# Patient Record
Sex: FEMALE | Race: ASIAN | Employment: UNEMPLOYED | URBAN - METROPOLITAN AREA
[De-identification: names, ages, dates, MRNs, and addresses within clinical notes are randomized per-mention and may not be internally consistent; named-entity substitution may affect disease eponyms.]

---

## 2017-02-17 ENCOUNTER — TELEPHONE (OUTPATIENT)
Dept: INTERNAL MEDICINE CLINIC | Facility: CLINIC | Age: 37
End: 2017-02-17

## 2017-02-18 NOTE — TELEPHONE ENCOUNTER
Patient called with concern for sinus infection. Has had sinus pressure and nasal congestion for 5 days. Is 24 weeks pregnant. +FM, no LOF, no CTX. No fever. Has taken Sudafed and tylenol which helps. Patient instructed to increase fluids.  Start nasal sali

## 2017-02-20 NOTE — TELEPHONE ENCOUNTER
Spoke with patient and scheduled appointment for tomorrow, she was wondering if dr could prescribe something today?

## 2017-02-21 ENCOUNTER — OFFICE VISIT (OUTPATIENT)
Dept: FAMILY MEDICINE CLINIC | Facility: CLINIC | Age: 37
End: 2017-02-21

## 2017-02-21 VITALS
HEIGHT: 67 IN | SYSTOLIC BLOOD PRESSURE: 94 MMHG | RESPIRATION RATE: 16 BRPM | TEMPERATURE: 98 F | WEIGHT: 147 LBS | DIASTOLIC BLOOD PRESSURE: 60 MMHG | OXYGEN SATURATION: 98 % | BODY MASS INDEX: 23.07 KG/M2 | HEART RATE: 106 BPM

## 2017-02-21 DIAGNOSIS — H66.001 ACUTE SUPPURATIVE OTITIS MEDIA OF RIGHT EAR WITHOUT SPONTANEOUS RUPTURE OF TYMPANIC MEMBRANE, RECURRENCE NOT SPECIFIED: ICD-10-CM

## 2017-02-21 DIAGNOSIS — J01.00 ACUTE NON-RECURRENT MAXILLARY SINUSITIS: Primary | ICD-10-CM

## 2017-02-21 DIAGNOSIS — H10.33 ACUTE CONJUNCTIVITIS OF BOTH EYES, UNSPECIFIED ACUTE CONJUNCTIVITIS TYPE: ICD-10-CM

## 2017-02-21 PROCEDURE — 99214 OFFICE O/P EST MOD 30 MIN: CPT | Performed by: FAMILY MEDICINE

## 2017-02-21 RX ORDER — ACETAMINOPHEN AND CODEINE PHOSPHATE 300; 30 MG/1; MG/1
1 TABLET ORAL 2 TIMES DAILY PRN
Qty: 15 TABLET | Refills: 0 | Status: SHIPPED | OUTPATIENT
Start: 2017-02-21 | End: 2017-05-01 | Stop reason: ALTCHOICE

## 2017-02-21 RX ORDER — TOBRAMYCIN 3 MG/ML
2 SOLUTION/ DROPS OPHTHALMIC EVERY 6 HOURS
Qty: 5 ML | Refills: 0 | Status: SHIPPED | OUTPATIENT
Start: 2017-02-21 | End: 2017-05-01 | Stop reason: ALTCHOICE

## 2017-02-21 RX ORDER — AMOXICILLIN 875 MG/1
875 TABLET, COATED ORAL 2 TIMES DAILY
Qty: 20 TABLET | Refills: 0 | Status: SHIPPED | OUTPATIENT
Start: 2017-02-21 | End: 2017-03-03

## 2017-02-21 NOTE — PROGRESS NOTES
Boy Kim is a 29year old female. HPI:  Patient presents with URI symptoms. Traveled overseas to [place] 2 weeks ago, returneded 4 days ago. while there started w/ congestion, sinus pressure and sinus headaches, sore throat.   Symptoms have been p Prenatal Multivit-Min-Fe-FA (PRENATAL OR) Take by mouth. Disp:  Rfl:    Omeprazole 40 MG Oral Capsule Delayed Release Take 1 capsule (40 mg total) by mouth daily.  Disp: 30 capsule Rfl: 6         Past Medical History   Diagnosis Date   • Acute gastritis w or edema  NEURO: A&O x3, no focal deficits  Normal gait    ASSESSMENT AND PLAN:    1. Acute non-recurrent maxillary sinusitis  2.  Acute suppurative otitis media of right ear without spontaneous rupture of tympanic membrane, recurrence not specified  Keep w

## 2017-03-17 PROCEDURE — 82950 GLUCOSE TEST: CPT | Performed by: OBSTETRICS & GYNECOLOGY

## 2017-04-25 ENCOUNTER — TELEPHONE (OUTPATIENT)
Dept: FAMILY MEDICINE CLINIC | Facility: CLINIC | Age: 37
End: 2017-04-25

## 2017-04-25 DIAGNOSIS — D50.9 IRON DEFICIENCY ANEMIA, UNSPECIFIED IRON DEFICIENCY ANEMIA TYPE: Primary | ICD-10-CM

## 2017-04-25 DIAGNOSIS — Z34.93 PREGNANT AND NOT YET DELIVERED, THIRD TRIMESTER: ICD-10-CM

## 2017-04-25 NOTE — TELEPHONE ENCOUNTER
Noted OB/Gyne recommendations for IV Fe.  Referral entered for Hematology, Dr. Mahendra Schmid, please have pt call their office for appointment

## 2017-04-25 NOTE — TELEPHONE ENCOUNTER
Pt called to say her OB -GYN  told her she needs to have a \"Hemoglobin IV Transfusion\" due to her anemia. Pt is asking for a referral for that.

## 2017-04-25 NOTE — TELEPHONE ENCOUNTER
Lmtcb, when patient calls back will inform of referral to Dr Marroquin Heading and telephone number to call

## 2017-04-27 ENCOUNTER — NURSE ONLY (OUTPATIENT)
Dept: HEMATOLOGY/ONCOLOGY | Facility: HOSPITAL | Age: 37
End: 2017-04-27

## 2017-05-01 ENCOUNTER — TELEPHONE (OUTPATIENT)
Dept: HEMATOLOGY/ONCOLOGY | Facility: HOSPITAL | Age: 37
End: 2017-05-01

## 2017-05-01 ENCOUNTER — OFFICE VISIT (OUTPATIENT)
Dept: HEMATOLOGY/ONCOLOGY | Facility: HOSPITAL | Age: 37
End: 2017-05-01
Attending: SPECIALIST
Payer: COMMERCIAL

## 2017-05-01 VITALS
TEMPERATURE: 98 F | OXYGEN SATURATION: 100 % | BODY MASS INDEX: 25.74 KG/M2 | SYSTOLIC BLOOD PRESSURE: 105 MMHG | DIASTOLIC BLOOD PRESSURE: 63 MMHG | HEART RATE: 88 BPM | HEIGHT: 67 IN | RESPIRATION RATE: 18 BRPM | WEIGHT: 164 LBS

## 2017-05-01 DIAGNOSIS — D50.8 IRON DEFICIENCY ANEMIA REFRACTORY TO IRON THERAPY: ICD-10-CM

## 2017-05-01 DIAGNOSIS — O99.013 ANTEPARTUM ANEMIA IN THIRD TRIMESTER: ICD-10-CM

## 2017-05-01 DIAGNOSIS — D50.0 IRON DEFICIENCY ANEMIA DUE TO CHRONIC BLOOD LOSS: ICD-10-CM

## 2017-05-01 DIAGNOSIS — D64.9 NORMOCYTIC NORMOCHROMIC ANEMIA: Primary | ICD-10-CM

## 2017-05-01 PROCEDURE — 99243 OFF/OP CNSLTJ NEW/EST LOW 30: CPT | Performed by: SPECIALIST

## 2017-05-01 RX ORDER — MELATONIN
325
COMMUNITY
End: 2017-05-16

## 2017-05-01 NOTE — PROGRESS NOTES
[facility] Report of Consultation      Patient Name: Jolly Sousa   YOB: 1980  Medical Record Number: [id number]  Consulting Physician: Adrianna Jones. Bharati Sorto M.D. Referring Physician: Billy Farias M.D.     Date of Consultation: 5/1/20 Constitutional      No fevers, chills, night sweats, excessive fatigue. Allergic/Immunologic No reactions. Eyes                   No significant visual changes. ENMT                  No oral pain, sore throat, epistaxis, hearing changes.   Endocrine affect appropriate; coherent speech; verbalizes understanding of our discussions today.     Laboratory     Recent Results (from the past 48 hour(s))  -RETICULOCYTE COUNT   Collection Time: 05/01/17  1:38 PM   Result Value Ref Range   Retic% 2.7 (H) 0.5-2.5 is markedly low despite oral iron therapy. Recommend IV iron therapy with iron dextran. Patient will return later this week for therapy. Planned Follow Up   Patient will return later this week for therapy. Electronically signed by:    Kirsten Moon

## 2017-05-01 NOTE — TELEPHONE ENCOUNTER
Return call from patient notified of test results. Patient transferred to [facility] to schedule iron infusion. Results released to My Chart.

## 2017-05-01 NOTE — PROGRESS NOTES
Patient is here today for Consult with Sergio Fuentes. Patient denies pain. Patient stated she is 34 weeks pregnant - has been on oral iron 325mg daily has increased nausea and constipation. Medication list and medical history were reviewed and updated.

## 2017-05-05 ENCOUNTER — OFFICE VISIT (OUTPATIENT)
Dept: HEMATOLOGY/ONCOLOGY | Age: 37
End: 2017-05-05
Attending: SPECIALIST
Payer: COMMERCIAL

## 2017-05-05 VITALS
RESPIRATION RATE: 16 BRPM | SYSTOLIC BLOOD PRESSURE: 96 MMHG | TEMPERATURE: 98 F | HEART RATE: 84 BPM | DIASTOLIC BLOOD PRESSURE: 64 MMHG

## 2017-05-05 DIAGNOSIS — D50.0 IRON DEFICIENCY ANEMIA DUE TO CHRONIC BLOOD LOSS: Primary | ICD-10-CM

## 2017-05-05 DIAGNOSIS — D50.8 IRON DEFICIENCY ANEMIA REFRACTORY TO IRON THERAPY: ICD-10-CM

## 2017-05-05 PROCEDURE — 96365 THER/PROPH/DIAG IV INF INIT: CPT

## 2017-05-05 NOTE — PATIENT INSTRUCTIONS
Iron Dextran injection  What is this medicine? IRON DEXTRAN (AHY jenaro DEX fr[place]) is an iron complex. Iron is used to make healthy red blood cells, which carry oxygen and nutrients through the body.  This medicine is used to treat people who cannot take iro

## 2017-05-05 NOTE — PROGRESS NOTES
Education Record  Learner:  Patient  Disease / Diagnosis:   Iron-deficiency anemia  Barriers / Limitations:  None  Method:  Printed material and Reinforcement  General Topics:  Plan of care reviewed;  InFed w/ potential side effects, purpose of med  Outcome

## 2017-05-16 PROCEDURE — 87147 CULTURE TYPE IMMUNOLOGIC: CPT | Performed by: OBSTETRICS & GYNECOLOGY

## 2017-05-16 PROCEDURE — 87081 CULTURE SCREEN ONLY: CPT | Performed by: OBSTETRICS & GYNECOLOGY

## 2017-06-09 ENCOUNTER — TELEPHONE (OUTPATIENT)
Dept: OBGYN UNIT | Facility: HOSPITAL | Age: 37
End: 2017-06-09

## 2017-06-09 ENCOUNTER — HOSPITAL ENCOUNTER (INPATIENT)
Facility: HOSPITAL | Age: 37
LOS: 2 days | Discharge: HOME | End: 2017-06-11
Attending: OBSTETRICS & GYNECOLOGY | Admitting: OBSTETRICS & GYNECOLOGY
Payer: COMMERCIAL

## 2017-06-09 ENCOUNTER — HOSPITAL ENCOUNTER (OUTPATIENT)
Dept: OBGYN CLINIC | Facility: HOSPITAL | Age: 37
Setting detail: OBSERVATION
Discharge: HOME | End: 2017-06-09
Payer: COMMERCIAL

## 2017-06-09 PROBLEM — Z34.90 PREGNANCY: Status: ACTIVE | Noted: 2017-06-09

## 2017-06-09 PROBLEM — Z34.90 PREGNANCY (HCC): Status: ACTIVE | Noted: 2017-06-09

## 2017-06-09 PROCEDURE — 86901 BLOOD TYPING SEROLOGIC RH(D): CPT | Performed by: OBSTETRICS & GYNECOLOGY

## 2017-06-09 PROCEDURE — 86850 RBC ANTIBODY SCREEN: CPT | Performed by: OBSTETRICS & GYNECOLOGY

## 2017-06-09 PROCEDURE — 86900 BLOOD TYPING SEROLOGIC ABO: CPT | Performed by: OBSTETRICS & GYNECOLOGY

## 2017-06-09 PROCEDURE — 3E033VJ INTRODUCTION OF OTHER HORMONE INTO PERIPHERAL VEIN, PERCUTANEOUS APPROACH: ICD-10-PCS | Performed by: OBSTETRICS & GYNECOLOGY

## 2017-06-09 PROCEDURE — 86780 TREPONEMA PALLIDUM: CPT | Performed by: OBSTETRICS & GYNECOLOGY

## 2017-06-09 PROCEDURE — 81002 URINALYSIS NONAUTO W/O SCOPE: CPT

## 2017-06-09 PROCEDURE — 85027 COMPLETE CBC AUTOMATED: CPT | Performed by: OBSTETRICS & GYNECOLOGY

## 2017-06-09 RX ORDER — TERBUTALINE SULFATE 1 MG/ML
0.25 INJECTION, SOLUTION SUBCUTANEOUS AS NEEDED
Status: DISCONTINUED | OUTPATIENT
Start: 2017-06-09 | End: 2017-06-10

## 2017-06-09 RX ORDER — SODIUM CHLORIDE, SODIUM LACTATE, POTASSIUM CHLORIDE, CALCIUM CHLORIDE 600; 310; 30; 20 MG/100ML; MG/100ML; MG/100ML; MG/100ML
INJECTION, SOLUTION INTRAVENOUS CONTINUOUS
Status: DISCONTINUED | OUTPATIENT
Start: 2017-06-09 | End: 2017-06-10

## 2017-06-09 RX ORDER — DEXTROSE, SODIUM CHLORIDE, SODIUM LACTATE, POTASSIUM CHLORIDE, AND CALCIUM CHLORIDE 5; .6; .31; .03; .02 G/100ML; G/100ML; G/100ML; G/100ML; G/100ML
INJECTION, SOLUTION INTRAVENOUS AS NEEDED
Status: DISCONTINUED | OUTPATIENT
Start: 2017-06-09 | End: 2017-06-10

## 2017-06-09 RX ORDER — IBUPROFEN 600 MG/1
600 TABLET ORAL ONCE AS NEEDED
Status: DISCONTINUED | OUTPATIENT
Start: 2017-06-09 | End: 2017-06-10

## 2017-06-10 PROBLEM — O48.0 POST-DATES PREGNANCY: Status: ACTIVE | Noted: 2017-06-10

## 2017-06-10 PROBLEM — O48.0 POST-DATES PREGNANCY (HCC): Status: ACTIVE | Noted: 2017-06-10

## 2017-06-10 PROCEDURE — 10907ZC DRAINAGE OF AMNIOTIC FLUID, THERAPEUTIC FROM PRODUCTS OF CONCEPTION, VIA NATURAL OR ARTIFICIAL OPENING: ICD-10-PCS | Performed by: OBSTETRICS & GYNECOLOGY

## 2017-06-10 PROCEDURE — 0HQ9XZZ REPAIR PERINEUM SKIN, EXTERNAL APPROACH: ICD-10-PCS | Performed by: OBSTETRICS & GYNECOLOGY

## 2017-06-10 RX ORDER — NALBUPHINE HCL 10 MG/ML
2.5 AMPUL (ML) INJECTION
Status: DISCONTINUED | OUTPATIENT
Start: 2017-06-10 | End: 2017-06-11

## 2017-06-10 RX ORDER — HYDROCODONE BITARTRATE AND ACETAMINOPHEN 5; 325 MG/1; MG/1
1 TABLET ORAL EVERY 4 HOURS PRN
Status: DISCONTINUED | OUTPATIENT
Start: 2017-06-10 | End: 2017-06-11

## 2017-06-10 RX ORDER — HYDROCODONE BITARTRATE AND ACETAMINOPHEN 5; 325 MG/1; MG/1
2 TABLET ORAL EVERY 4 HOURS PRN
Status: DISCONTINUED | OUTPATIENT
Start: 2017-06-10 | End: 2017-06-11

## 2017-06-10 RX ORDER — IBUPROFEN 600 MG/1
600 TABLET ORAL EVERY 6 HOURS
Status: DISCONTINUED | OUTPATIENT
Start: 2017-06-10 | End: 2017-06-11

## 2017-06-10 RX ORDER — ZOLPIDEM TARTRATE 5 MG/1
5 TABLET ORAL NIGHTLY PRN
Status: DISCONTINUED | OUTPATIENT
Start: 2017-06-10 | End: 2017-06-11

## 2017-06-10 RX ORDER — SIMETHICONE 80 MG
80 TABLET,CHEWABLE ORAL 3 TIMES DAILY PRN
Status: DISCONTINUED | OUTPATIENT
Start: 2017-06-10 | End: 2017-06-11

## 2017-06-10 RX ORDER — DOCUSATE SODIUM 100 MG/1
100 CAPSULE, LIQUID FILLED ORAL
Status: DISCONTINUED | OUTPATIENT
Start: 2017-06-10 | End: 2017-06-11

## 2017-06-10 RX ORDER — EPHEDRINE SULFATE 50 MG/ML
5 INJECTION, SOLUTION INTRAVENOUS AS NEEDED
Status: DISCONTINUED | OUTPATIENT
Start: 2017-06-10 | End: 2017-06-10

## 2017-06-10 RX ORDER — ACETAMINOPHEN 325 MG/1
650 TABLET ORAL EVERY 4 HOURS PRN
Status: DISCONTINUED | OUTPATIENT
Start: 2017-06-10 | End: 2017-06-11

## 2017-06-10 RX ORDER — BISACODYL 10 MG
10 SUPPOSITORY, RECTAL RECTAL ONCE AS NEEDED
Status: ACTIVE | OUTPATIENT
Start: 2017-06-10 | End: 2017-06-10

## 2017-06-10 NOTE — PLAN OF CARE
Problem: BIRTH - VAGINAL/ SECTION  Goal: Fetal and maternal status remain reassuring during the birth process  INTERVENTIONS:  - Monitor vital signs  - Monitor fetal heart rate  - Monitor uterine activity  - Monitor labor progression (vaginal deliv appropriate  - Consider OT/PT consult to assist with strengthening/mobility  - Encourage toileting schedule   Outcome: Progressing

## 2017-06-10 NOTE — PLAN OF CARE
Patient direct admit to room 105 for IOL. Discussed POC with patient and spouse, questions answered and verbalized understanding of teaching. Instructed to call for assistance if needed. Call light in reach. Will continue to monitor.

## 2017-06-10 NOTE — PROGRESS NOTES
Pt transferred to room 2211, report updated with [facility], RN and ID bands verified x2 RN's

## 2017-06-10 NOTE — OPERATIVE REPORT
She was found to be complete/+2. She pushed with good effort for 20 minutes under epidural anesthesia. She then delivered a viable baby boy via . A nuchal cord x1 was cut at the perineum. The baby was vigorous on delivery and was bulb suctioned.  He was Weight:  7 lb 13 oz Length:  49.5 cm   Head circum.:  33.5 cm Chest circum.:  32 cm      Abdominal circum.:  29.5 cm           Placenta    Date/time:  6/10/2017 1054   Removal:  Spontaneous   Appearance:  Intact   Disposition:  held for future pathology

## 2017-06-10 NOTE — H&P
[facility]    History & Physical    Rosy Andrade Patient Status:  Inpatient    1980 MRN [id number]   [facility] 1NW-A Attending Jass Rocha MD   Hosp Day # 1 PCP Felipe Kaur MD     Date of Admission:  2017    [address] [place] [address] (eight) hours as needed for Nausea. Disp:  Rfl:  Past Week at Unknown time   Doxylamine-Pyridoxine (DICLEGIS) 10-10 MG Oral Tab EC Take by mouth. Disp:  Rfl:  Past Week at Unknown time   Prenatal Multivit-Min-Fe-FA (PRENATAL OR) Take by mouth.  Disp:  Rfl: detail. All questions answered, all appropriate consents will be signed at the Hospital. Admission is planned for today. Continue present management. Aretta Barthel  6/9/2017  9PM

## 2017-06-10 NOTE — PROGRESS NOTES
Pt up to br, tolerated well. Pt able to void in br. Donna care done, pads/panties/gown changed. TO room 2211 via w/c, holding infant with RN, PCT and spouse. Pt and baby in stable condition.

## 2017-06-10 NOTE — PLAN OF CARE
Problem: SAFETY ADULT - FALL  Goal: Free from fall injury  INTERVENTIONS:  - Assess pt frequently for physical needs  - Identify cognitive and physical deficits and behaviors that affect risk of falls.   - [place] fall precautions as indicated by assessme

## 2017-06-10 NOTE — PROGRESS NOTES
Patient admitted to MB via Raúl Javan to room  Safety precautions initiated  Bed in low position  Call light in reach  Knows to call for assistance first time out of bed

## 2017-06-10 NOTE — PROGRESS NOTES
Pt is a 40year old female admitted to 105/105-A. Patient presents with:  Scheduled Induction: Patient here for IOL, pt with a history of C/S and successful . Patient states occasional irregular contractions.  Denies leaking of fluid or vaginal bleedi

## 2017-06-11 VITALS
OXYGEN SATURATION: 100 % | RESPIRATION RATE: 18 BRPM | BODY MASS INDEX: 25.27 KG/M2 | WEIGHT: 161 LBS | HEART RATE: 69 BPM | SYSTOLIC BLOOD PRESSURE: 91 MMHG | DIASTOLIC BLOOD PRESSURE: 56 MMHG | TEMPERATURE: 99 F | HEIGHT: 67.01 IN

## 2017-06-11 PROBLEM — O48.0 POST-DATES PREGNANCY (HCC): Status: RESOLVED | Noted: 2017-06-10 | Resolved: 2017-06-11

## 2017-06-11 PROBLEM — Z34.90 PREGNANCY: Status: RESOLVED | Noted: 2017-06-09 | Resolved: 2017-06-11

## 2017-06-11 PROBLEM — Z34.90 PREGNANCY (HCC): Status: RESOLVED | Noted: 2017-06-09 | Resolved: 2017-06-11

## 2017-06-11 PROBLEM — O48.0 POST-DATES PREGNANCY: Status: RESOLVED | Noted: 2017-06-10 | Resolved: 2017-06-11

## 2017-06-11 PROBLEM — O99.013 ANTEPARTUM ANEMIA IN THIRD TRIMESTER: Status: RESOLVED | Noted: 2017-05-01 | Resolved: 2017-06-11

## 2017-06-11 PROBLEM — O99.013 ANTEPARTUM ANEMIA IN THIRD TRIMESTER (HCC): Status: RESOLVED | Noted: 2017-05-01 | Resolved: 2017-06-11

## 2017-06-11 PROCEDURE — 85025 COMPLETE CBC W/AUTO DIFF WBC: CPT | Performed by: OBSTETRICS & GYNECOLOGY

## 2017-06-11 PROCEDURE — 85461 HEMOGLOBIN FETAL: CPT | Performed by: OBSTETRICS & GYNECOLOGY

## 2017-06-11 PROCEDURE — 86901 BLOOD TYPING SEROLOGIC RH(D): CPT | Performed by: OBSTETRICS & GYNECOLOGY

## 2017-06-11 PROCEDURE — 86900 BLOOD TYPING SEROLOGIC ABO: CPT | Performed by: OBSTETRICS & GYNECOLOGY

## 2017-06-11 RX ORDER — IBUPROFEN 600 MG/1
600 TABLET ORAL EVERY 6 HOURS PRN
Qty: 60 TABLET | Refills: 0 | Status: SHIPPED | OUTPATIENT
Start: 2017-06-11 | End: 2017-07-17

## 2017-06-11 RX ORDER — HYDROCODONE BITARTRATE AND ACETAMINOPHEN 5; 325 MG/1; MG/1
1-2 TABLET ORAL EVERY 4 HOURS PRN
Qty: 30 TABLET | Refills: 0 | Status: SHIPPED | OUTPATIENT
Start: 2017-06-11 | End: 2017-07-17

## 2017-06-11 NOTE — PROGRESS NOTES
Discharge patient home as order. Teaching complete, patient feel comfortable to take care herself and  infant. Hugs and kisses off. Sent both mom and infant to their family car @ 12.

## 2017-06-11 NOTE — PROGRESS NOTES
OB Progress Note PPD#1  S: Feels well. Ambulating, eating. Pain controlled. Minimal VB. Breastfeeding. O:   Blood pressure 91/56, pulse 69, temperature 98.5 °F (36.9 °C), temperature source Oral, resp.  rate 18, height 5' 7.01\" (1.702 m), weight 161 lb

## 2017-06-15 ENCOUNTER — TELEPHONE (OUTPATIENT)
Dept: OBGYN UNIT | Facility: HOSPITAL | Age: 37
End: 2017-06-15

## 2017-07-03 ENCOUNTER — TELEPHONE (OUTPATIENT)
Dept: FAMILY MEDICINE CLINIC | Facility: CLINIC | Age: 37
End: 2017-07-03

## 2017-07-03 NOTE — TELEPHONE ENCOUNTER
Patient stated she has had breast engorgement before but has never had temperature, advised her to be seen at walk in clinic  As she also expressed pain when she expresses her milk, she will go to walk in clinic as she is having a temp

## 2017-07-03 NOTE — TELEPHONE ENCOUNTER
Pt called saying she is breast feeding, having breast pain, slight temp, 99.2. Wants to speak to nurse.  Should she go to walk in?

## 2017-07-17 PROCEDURE — 87624 HPV HI-RISK TYP POOLED RSLT: CPT | Performed by: OBSTETRICS & GYNECOLOGY

## 2017-07-17 PROCEDURE — 88175 CYTOPATH C/V AUTO FLUID REDO: CPT | Performed by: OBSTETRICS & GYNECOLOGY

## 2018-03-09 ENCOUNTER — OFFICE VISIT (OUTPATIENT)
Dept: FAMILY MEDICINE CLINIC | Facility: CLINIC | Age: 38
End: 2018-03-09

## 2018-03-09 VITALS
WEIGHT: 130.19 LBS | OXYGEN SATURATION: 98 % | DIASTOLIC BLOOD PRESSURE: 60 MMHG | SYSTOLIC BLOOD PRESSURE: 92 MMHG | HEART RATE: 66 BPM | BODY MASS INDEX: 20.68 KG/M2 | HEIGHT: 66.5 IN | RESPIRATION RATE: 18 BRPM | TEMPERATURE: 98 F

## 2018-03-09 DIAGNOSIS — Z80.3 FAMILY HISTORY OF BREAST CANCER IN FEMALE: ICD-10-CM

## 2018-03-09 DIAGNOSIS — Z13.21 ENCOUNTER FOR VITAMIN DEFICIENCY SCREENING: ICD-10-CM

## 2018-03-09 DIAGNOSIS — Z00.00 ROUTINE PHYSICAL EXAMINATION: Primary | ICD-10-CM

## 2018-03-09 DIAGNOSIS — K21.9 GASTROESOPHAGEAL REFLUX DISEASE WITHOUT ESOPHAGITIS: ICD-10-CM

## 2018-03-09 DIAGNOSIS — Z00.00 LABORATORY EXAMINATION ORDERED AS PART OF A COMPLETE PHYSICAL EXAMINATION: ICD-10-CM

## 2018-03-09 DIAGNOSIS — D50.8 OTHER IRON DEFICIENCY ANEMIA: ICD-10-CM

## 2018-03-09 PROCEDURE — 99395 PREV VISIT EST AGE 18-39: CPT | Performed by: FAMILY MEDICINE

## 2018-03-09 RX ORDER — OMEPRAZOLE 40 MG/1
40 CAPSULE, DELAYED RELEASE ORAL DAILY
Qty: 30 CAPSULE | Refills: 5 | Status: SHIPPED | OUTPATIENT
Start: 2018-03-09 | End: 2018-12-27

## 2018-03-09 NOTE — PROGRESS NOTES
John Aguilar is a 40year old female. HPI:  Pt is here for physical  Is 9 months postpartum, breastfeeding  Intermittent acid reflux  Tried Prevacid prn and not helping much.  Omeprazole helps much better  Having chronic, intermittent low back pain since l ×2  3/2012: OTHER SURGICAL HISTORY      Comment: EGD    Social History:  Smoking status: Never Smoker                                                              Smokeless tobacco: Never Used                      Comment: Non-smoker  Alcohol use:  No (14); Future  - LIPID PANEL; Future  - TSH W REFLEX TO FREE T4; Future  - VITAMIN D, 25-HYDROXY; Future  - VITAMIN B12; Future  - FERRITIN; Future    3.  Encounter for vitamin deficiency screening  Reviewed Vitamin D supplements  - VITAMIN D, 25-HYDROXY; Fu

## 2018-03-18 PROBLEM — Z80.3 FAMILY HISTORY OF BREAST CANCER IN FEMALE: Status: ACTIVE | Noted: 2018-03-18

## 2018-03-18 RX ORDER — CHOLECALCIFEROL (VITAMIN D3) 1250 MCG
CAPSULE ORAL
COMMUNITY
End: 2018-04-09

## 2018-03-20 ENCOUNTER — LAB ENCOUNTER (OUTPATIENT)
Dept: LAB | Age: 38
End: 2018-03-20
Attending: FAMILY MEDICINE
Payer: COMMERCIAL

## 2018-03-20 DIAGNOSIS — Z13.21 ENCOUNTER FOR VITAMIN DEFICIENCY SCREENING: ICD-10-CM

## 2018-03-20 DIAGNOSIS — Z00.00 LABORATORY EXAMINATION ORDERED AS PART OF A COMPLETE PHYSICAL EXAMINATION: ICD-10-CM

## 2018-03-20 DIAGNOSIS — D50.8 OTHER IRON DEFICIENCY ANEMIA: ICD-10-CM

## 2018-03-20 LAB
25-HYDROXYVITAMIN D (TOTAL): 34.8 NG/ML (ref 30–100)
ALBUMIN SERPL-MCNC: 4 G/DL (ref 3.5–4.8)
ALP LIVER SERPL-CCNC: 85 U/L (ref 37–98)
ALT SERPL-CCNC: 21 U/L (ref 14–54)
AST SERPL-CCNC: 19 U/L (ref 15–41)
BASOPHILS # BLD AUTO: 0.07 X10(3) UL (ref 0–0.1)
BASOPHILS NFR BLD AUTO: 1.1 %
BILIRUB SERPL-MCNC: 0.5 MG/DL (ref 0.1–2)
BUN BLD-MCNC: 12 MG/DL (ref 8–20)
CALCIUM BLD-MCNC: 9.4 MG/DL (ref 8.3–10.3)
CHLORIDE: 106 MMOL/L (ref 101–111)
CHOLEST SMN-MCNC: 159 MG/DL (ref ?–200)
CO2: 29 MMOL/L (ref 22–32)
CREAT BLD-MCNC: 0.59 MG/DL (ref 0.55–1.02)
DEPRECATED HBV CORE AB SER IA-ACNC: 132.7 NG/ML (ref 10–291)
EOSINOPHIL # BLD AUTO: 0.16 X10(3) UL (ref 0–0.3)
EOSINOPHIL NFR BLD AUTO: 2.4 %
ERYTHROCYTE [DISTWIDTH] IN BLOOD BY AUTOMATED COUNT: 13.1 % (ref 11.5–16)
FREE T4: 0.4 NG/DL (ref 0.9–1.8)
GLUCOSE BLD-MCNC: 93 MG/DL (ref 70–99)
HAV AB SERPL IA-ACNC: 695 PG/ML (ref 193–986)
HCT VFR BLD AUTO: 40.4 % (ref 34–50)
HDLC SERPL-MCNC: 61 MG/DL (ref 45–?)
HDLC SERPL: 2.61 {RATIO} (ref ?–4.44)
HGB BLD-MCNC: 13.2 G/DL (ref 12–16)
IMMATURE GRANULOCYTE COUNT: 0.02 X10(3) UL (ref 0–1)
IMMATURE GRANULOCYTE RATIO %: 0.3 %
LDLC SERPL CALC-MCNC: 86 MG/DL (ref ?–130)
LYMPHOCYTES # BLD AUTO: 2.71 X10(3) UL (ref 0.9–4)
LYMPHOCYTES NFR BLD AUTO: 40.9 %
M PROTEIN MFR SERPL ELPH: 7.6 G/DL (ref 6.1–8.3)
MCH RBC QN AUTO: 27.6 PG (ref 27–33.2)
MCHC RBC AUTO-ENTMCNC: 32.7 G/DL (ref 31–37)
MCV RBC AUTO: 84.3 FL (ref 81–100)
MONOCYTES # BLD AUTO: 0.3 X10(3) UL (ref 0.1–1)
MONOCYTES NFR BLD AUTO: 4.5 %
NEUTROPHIL ABS PRELIM: 3.37 X10 (3) UL (ref 1.3–6.7)
NEUTROPHILS # BLD AUTO: 3.37 X10(3) UL (ref 1.3–6.7)
NEUTROPHILS NFR BLD AUTO: 50.8 %
NONHDLC SERPL-MCNC: 98 MG/DL (ref ?–130)
PLATELET # BLD AUTO: 294 10(3)UL (ref 150–450)
POTASSIUM SERPL-SCNC: 4.2 MMOL/L (ref 3.6–5.1)
RBC # BLD AUTO: 4.79 X10(6)UL (ref 3.8–5.1)
RED CELL DISTRIBUTION WIDTH-SD: 40.3 FL (ref 35.1–46.3)
SODIUM SERPL-SCNC: 141 MMOL/L (ref 136–144)
TRIGL SERPL-MCNC: 62 MG/DL (ref ?–150)
TSI SER-ACNC: 14.8 MIU/ML (ref 0.35–5.5)
VLDLC SERPL CALC-MCNC: 12 MG/DL (ref 5–40)
WBC # BLD AUTO: 6.6 X10(3) UL (ref 4–13)

## 2018-03-20 PROCEDURE — 82607 VITAMIN B-12: CPT

## 2018-03-20 PROCEDURE — 80050 GENERAL HEALTH PANEL: CPT

## 2018-03-20 PROCEDURE — 84443 ASSAY THYROID STIM HORMONE: CPT

## 2018-03-20 PROCEDURE — 80053 COMPREHEN METABOLIC PANEL: CPT

## 2018-03-20 PROCEDURE — 82306 VITAMIN D 25 HYDROXY: CPT

## 2018-03-20 PROCEDURE — 84439 ASSAY OF FREE THYROXINE: CPT

## 2018-03-20 PROCEDURE — 80061 LIPID PANEL: CPT

## 2018-03-20 PROCEDURE — 82728 ASSAY OF FERRITIN: CPT

## 2018-03-20 PROCEDURE — 36415 COLL VENOUS BLD VENIPUNCTURE: CPT

## 2018-04-03 ENCOUNTER — OFFICE VISIT (OUTPATIENT)
Dept: FAMILY MEDICINE CLINIC | Facility: CLINIC | Age: 38
End: 2018-04-03

## 2018-04-03 VITALS
SYSTOLIC BLOOD PRESSURE: 100 MMHG | RESPIRATION RATE: 16 BRPM | OXYGEN SATURATION: 99 % | BODY MASS INDEX: 20.96 KG/M2 | WEIGHT: 132 LBS | TEMPERATURE: 98 F | HEART RATE: 73 BPM | DIASTOLIC BLOOD PRESSURE: 56 MMHG | HEIGHT: 66.5 IN

## 2018-04-03 DIAGNOSIS — M25.561 ACUTE PAIN OF RIGHT KNEE: ICD-10-CM

## 2018-04-03 DIAGNOSIS — K21.9 GASTROESOPHAGEAL REFLUX DISEASE WITHOUT ESOPHAGITIS: ICD-10-CM

## 2018-04-03 DIAGNOSIS — E03.9 ACQUIRED HYPOTHYROIDISM: Primary | ICD-10-CM

## 2018-04-03 DIAGNOSIS — M79.672 LEFT FOOT PAIN: ICD-10-CM

## 2018-04-03 PROCEDURE — 99213 OFFICE O/P EST LOW 20 MIN: CPT | Performed by: FAMILY MEDICINE

## 2018-04-03 RX ORDER — LEVOTHYROXINE SODIUM 0.05 MG/1
50 TABLET ORAL
Qty: 90 TABLET | Refills: 0 | Status: SHIPPED | OUTPATIENT
Start: 2018-04-03 | End: 2018-07-02

## 2018-04-03 NOTE — PROGRESS NOTES
Laura Adair is a 40year old female. HPI:  Patient is here for follow-up on recent labs. Elevated TSH. Patient complains of fatigue, weight gain, dry skin. No menses as is breast-feeding. No neck pain or dysphagia.   Has family history of hypothyroi days.  Now using as needed. Prefers to avoid daily medication with nursing.   : normal bladder  NEURO: denies headaches, denies dizziness    EXAM:  /56   Pulse 73   Temp 97.8 °F (36.6 °C) (Oral)   Resp 16   Ht 66.5\"   Wt 132 lb   SpO2 99%   BMI 20

## 2018-07-02 ENCOUNTER — TELEPHONE (OUTPATIENT)
Dept: FAMILY MEDICINE CLINIC | Facility: CLINIC | Age: 38
End: 2018-07-02

## 2018-07-02 RX ORDER — LEVOTHYROXINE SODIUM 0.05 MG/1
TABLET ORAL
Qty: 30 TABLET | Refills: 0 | Status: SHIPPED | OUTPATIENT
Start: 2018-07-02 | End: 2018-07-18

## 2018-07-02 NOTE — TELEPHONE ENCOUNTER
Pt called stating at her last appointment  told her to get blood work checked but Pt did not recall when she should have blood work done.   Please advise

## 2018-07-02 NOTE — TELEPHONE ENCOUNTER
LOV 4/3/18 and at that time, pt was advised to recheck thyroid labs in 2 months. Pt notified and she get labs done.

## 2018-07-03 NOTE — TELEPHONE ENCOUNTER
meds refilled for # 30 only  Pt is overdue for labs for TFTs and f/u ov. Please notify pt and schedule f/u appt.

## 2018-07-05 NOTE — TELEPHONE ENCOUNTER
Please call pt and assist her in scheduling appt and advise that she get her labs done. Routed to front staff.

## 2018-07-09 ENCOUNTER — APPOINTMENT (OUTPATIENT)
Dept: LAB | Age: 38
End: 2018-07-09
Attending: FAMILY MEDICINE
Payer: COMMERCIAL

## 2018-07-09 DIAGNOSIS — E03.9 ACQUIRED HYPOTHYROIDISM: ICD-10-CM

## 2018-07-09 LAB
FREE T4: 1 NG/DL (ref 0.9–1.8)
TSI SER-ACNC: 0.46 MIU/ML (ref 0.35–5.5)

## 2018-07-09 PROCEDURE — 84439 ASSAY OF FREE THYROXINE: CPT

## 2018-07-09 PROCEDURE — 84443 ASSAY THYROID STIM HORMONE: CPT

## 2018-07-09 PROCEDURE — 36415 COLL VENOUS BLD VENIPUNCTURE: CPT

## 2018-07-18 ENCOUNTER — OFFICE VISIT (OUTPATIENT)
Dept: FAMILY MEDICINE CLINIC | Facility: CLINIC | Age: 38
End: 2018-07-18
Payer: COMMERCIAL

## 2018-07-18 VITALS
HEART RATE: 56 BPM | DIASTOLIC BLOOD PRESSURE: 58 MMHG | OXYGEN SATURATION: 99 % | SYSTOLIC BLOOD PRESSURE: 102 MMHG | WEIGHT: 130.63 LBS | BODY MASS INDEX: 20.75 KG/M2 | TEMPERATURE: 98 F | RESPIRATION RATE: 16 BRPM | HEIGHT: 66.5 IN

## 2018-07-18 DIAGNOSIS — Z51.81 ENCOUNTER FOR MEDICATION MONITORING: ICD-10-CM

## 2018-07-18 DIAGNOSIS — K21.9 GASTROESOPHAGEAL REFLUX DISEASE WITHOUT ESOPHAGITIS: ICD-10-CM

## 2018-07-18 DIAGNOSIS — E03.9 ACQUIRED HYPOTHYROIDISM: Primary | ICD-10-CM

## 2018-07-18 DIAGNOSIS — R23.8 EASY BRUISING: ICD-10-CM

## 2018-07-18 PROCEDURE — 99213 OFFICE O/P EST LOW 20 MIN: CPT | Performed by: FAMILY MEDICINE

## 2018-07-18 RX ORDER — ERGOCALCIFEROL (VITAMIN D2) 10 MCG
TABLET ORAL DAILY
COMMUNITY

## 2018-07-18 RX ORDER — LEVOTHYROXINE SODIUM 0.05 MG/1
TABLET ORAL
Qty: 30 TABLET | Refills: 5 | Status: SHIPPED | OUTPATIENT
Start: 2018-07-18 | End: 2019-02-11

## 2018-07-18 NOTE — PROGRESS NOTES
Rey Winslow is a 41year old female. HPI:  Patient is here for follow-up on her hypothyroidism. Taking levothyroxine daily in a.m.'s as directed. Has noticed less fatigue but not sure if it is meds or due to summer days.   No med SEs  Does have family hi Smokeless tobacco: Never Used                      Comment: Non-smoker  Alcohol use: No                            REVIEW OF SYSTEMS:  GENERAL HEALTH: feels well otherwise, mood is good  RESPIRA medication a few times per month.   CPM

## 2018-12-27 DIAGNOSIS — K21.9 GASTROESOPHAGEAL REFLUX DISEASE WITHOUT ESOPHAGITIS: ICD-10-CM

## 2018-12-31 RX ORDER — OMEPRAZOLE 40 MG/1
CAPSULE, DELAYED RELEASE ORAL
Qty: 30 CAPSULE | Refills: 2 | Status: SHIPPED | OUTPATIENT
Start: 2018-12-31 | End: 2019-04-16

## 2018-12-31 NOTE — TELEPHONE ENCOUNTER
LOV 7-18-18    LAST LAB 3-20-18    LAST RX 3-9-18 x 6 months    Next OV No future appointments.       PROTOCOL  NONE, please advise

## 2019-01-12 ENCOUNTER — APPOINTMENT (OUTPATIENT)
Dept: LAB | Age: 39
End: 2019-01-12
Attending: FAMILY MEDICINE
Payer: COMMERCIAL

## 2019-01-12 DIAGNOSIS — E03.9 ACQUIRED HYPOTHYROIDISM: ICD-10-CM

## 2019-01-12 DIAGNOSIS — Z51.81 ENCOUNTER FOR MEDICATION MONITORING: ICD-10-CM

## 2019-01-12 LAB
T4 FREE SERPL-MCNC: 0.9 NG/DL (ref 0.9–1.8)
TSI SER-ACNC: 0.78 MIU/ML (ref 0.35–5.5)

## 2019-01-12 PROCEDURE — 36415 COLL VENOUS BLD VENIPUNCTURE: CPT

## 2019-01-12 PROCEDURE — 84439 ASSAY OF FREE THYROXINE: CPT

## 2019-01-12 PROCEDURE — 84443 ASSAY THYROID STIM HORMONE: CPT

## 2019-02-11 DIAGNOSIS — E03.9 ACQUIRED HYPOTHYROIDISM: ICD-10-CM

## 2019-02-11 RX ORDER — LEVOTHYROXINE SODIUM 0.05 MG/1
TABLET ORAL
Qty: 90 TABLET | Refills: 0 | Status: SHIPPED | OUTPATIENT
Start: 2019-02-11 | End: 2019-05-06

## 2019-02-11 NOTE — TELEPHONE ENCOUNTER
Name from pharmacy: [address] [place] [address]          Will file in chart as: LEVOTHYROXINE SODIUM 50 MCG Oral Tab    Sig: TAKE 1 TABLET BY MOUTH BEFORE BREAKFAST    Disp:  30 tablet    Refills:  5    Start: 2/11/2019    Class: Normal    For: Acquired hypoth

## 2019-03-19 ENCOUNTER — OFFICE VISIT (OUTPATIENT)
Dept: FAMILY MEDICINE CLINIC | Facility: CLINIC | Age: 39
End: 2019-03-19
Payer: COMMERCIAL

## 2019-03-19 VITALS
DIASTOLIC BLOOD PRESSURE: 56 MMHG | SYSTOLIC BLOOD PRESSURE: 98 MMHG | TEMPERATURE: 98 F | RESPIRATION RATE: 16 BRPM | HEIGHT: 65.59 IN | HEART RATE: 84 BPM | OXYGEN SATURATION: 98 % | BODY MASS INDEX: 21.14 KG/M2 | WEIGHT: 130 LBS

## 2019-03-19 DIAGNOSIS — E03.9 ACQUIRED HYPOTHYROIDISM: ICD-10-CM

## 2019-03-19 DIAGNOSIS — N39.3 SUI (STRESS URINARY INCONTINENCE, FEMALE): ICD-10-CM

## 2019-03-19 DIAGNOSIS — Z01.419 ROUTINE GYNECOLOGICAL EXAMINATION: Primary | ICD-10-CM

## 2019-03-19 DIAGNOSIS — K21.9 GASTROESOPHAGEAL REFLUX DISEASE WITHOUT ESOPHAGITIS: ICD-10-CM

## 2019-03-19 DIAGNOSIS — Z83.3 FAMILY HISTORY OF DIABETES MELLITUS: ICD-10-CM

## 2019-03-19 DIAGNOSIS — K64.4 EXTERNAL HEMORRHOID: ICD-10-CM

## 2019-03-19 DIAGNOSIS — Z00.00 LABORATORY EXAMINATION ORDERED AS PART OF A COMPLETE PHYSICAL EXAMINATION: ICD-10-CM

## 2019-03-19 PROCEDURE — 99395 PREV VISIT EST AGE 18-39: CPT | Performed by: FAMILY MEDICINE

## 2019-03-19 NOTE — PATIENT INSTRUCTIONS
Kegel Exercises  Kegel exercises don’t need special clothing or equipment. They’re easy to learn and simple to do. And if you do them right, no one can tell you’re doing them, so they can be done almost anywhere.  Your healthcare provider, nurse, or physi · Tighten your pelvic floor before you sneeze, get up from a chair, cough, laugh, or lift. This protects your pelvic floor from injury and can help prevent urine leakage. Date Last Reviewed: 10/1/2017  © 4723-9392 [employer] [address].  [address]

## 2019-03-19 NOTE — PROGRESS NOTES
Amy Cochran is a 41year old female. HPI:  Pt is here for physical  Taking Levothyroxine daily in AMs  No unusual fatigue  Weaning from breastfeeding her almost 3year old  Menses started after delivery again since 10/2018, regular now  Lasts 5-6 days.  Padmini Marie   ,      ×2   • OTHER SURGICAL HISTORY  3/2012    EGD       Social History    Tobacco Use      Smoking status: Never Smoker      Smokeless tobacco: Never Used      Tobacco comment: Non-smoker    Alcohol use: No    Drug use: No   tenderness. About 1.5 cm external hemorrhoid noted superiorly. Tender, nonthrombosed, no bleeding. ASSESSMENT AND PLAN:    1.  Routine gynecological examination  Reviewed diet/exercise/skin care/safety/BSE/BMI goals  Reviewed immunizations: Up-to-alexx

## 2019-03-22 PROBLEM — D50.8 IRON DEFICIENCY ANEMIA REFRACTORY TO IRON THERAPY: Status: RESOLVED | Noted: 2017-05-01 | Resolved: 2019-03-22

## 2019-03-28 ENCOUNTER — LAB ENCOUNTER (OUTPATIENT)
Dept: LAB | Age: 39
End: 2019-03-28
Attending: FAMILY MEDICINE
Payer: COMMERCIAL

## 2019-03-28 DIAGNOSIS — Z83.3 FAMILY HISTORY OF DIABETES MELLITUS: ICD-10-CM

## 2019-03-28 DIAGNOSIS — Z00.00 LABORATORY EXAMINATION ORDERED AS PART OF A COMPLETE PHYSICAL EXAMINATION: ICD-10-CM

## 2019-03-28 LAB
ALBUMIN SERPL-MCNC: 3.9 G/DL (ref 3.4–5)
ALBUMIN/GLOB SERPL: 1.1 {RATIO} (ref 1–2)
ALP LIVER SERPL-CCNC: 63 U/L (ref 37–98)
ALT SERPL-CCNC: 18 U/L (ref 13–56)
ANION GAP SERPL CALC-SCNC: 8 MMOL/L (ref 0–18)
AST SERPL-CCNC: 13 U/L (ref 15–37)
BASOPHILS # BLD AUTO: 0.05 X10(3) UL (ref 0–0.2)
BASOPHILS NFR BLD AUTO: 0.8 %
BILIRUB SERPL-MCNC: 0.7 MG/DL (ref 0.1–2)
BUN BLD-MCNC: 11 MG/DL (ref 7–18)
BUN/CREAT SERPL: 17.5 (ref 10–20)
CALCIUM BLD-MCNC: 8.5 MG/DL (ref 8.5–10.1)
CHLORIDE SERPL-SCNC: 109 MMOL/L (ref 98–107)
CHOLEST SMN-MCNC: 127 MG/DL (ref ?–200)
CO2 SERPL-SCNC: 25 MMOL/L (ref 21–32)
CREAT BLD-MCNC: 0.63 MG/DL (ref 0.55–1.02)
DEPRECATED RDW RBC AUTO: 40 FL (ref 35.1–46.3)
EOSINOPHIL # BLD AUTO: 0.1 X10(3) UL (ref 0–0.7)
EOSINOPHIL NFR BLD AUTO: 1.7 %
ERYTHROCYTE [DISTWIDTH] IN BLOOD BY AUTOMATED COUNT: 12.7 % (ref 11–15)
EST. AVERAGE GLUCOSE BLD GHB EST-MCNC: 114 MG/DL (ref 68–126)
GLOBULIN PLAS-MCNC: 3.6 G/DL (ref 2.8–4.4)
GLUCOSE BLD-MCNC: 92 MG/DL (ref 70–99)
HBA1C MFR BLD HPLC: 5.6 % (ref ?–5.7)
HCT VFR BLD AUTO: 37.7 % (ref 35–48)
HDLC SERPL-MCNC: 55 MG/DL (ref 40–59)
HGB BLD-MCNC: 12.7 G/DL (ref 12–16)
IMM GRANULOCYTES # BLD AUTO: 0.01 X10(3) UL (ref 0–1)
IMM GRANULOCYTES NFR BLD: 0.2 %
LDLC SERPL CALC-MCNC: 65 MG/DL (ref ?–100)
LYMPHOCYTES # BLD AUTO: 2.38 X10(3) UL (ref 1–4)
LYMPHOCYTES NFR BLD AUTO: 40 %
M PROTEIN MFR SERPL ELPH: 7.5 G/DL (ref 6.4–8.2)
MCH RBC QN AUTO: 29 PG (ref 26–34)
MCHC RBC AUTO-ENTMCNC: 33.7 G/DL (ref 31–37)
MCV RBC AUTO: 86.1 FL (ref 80–100)
MONOCYTES # BLD AUTO: 0.27 X10(3) UL (ref 0.1–1)
MONOCYTES NFR BLD AUTO: 4.5 %
NEUTROPHILS # BLD AUTO: 3.14 X10 (3) UL (ref 1.5–7.7)
NEUTROPHILS # BLD AUTO: 3.14 X10(3) UL (ref 1.5–7.7)
NEUTROPHILS NFR BLD AUTO: 52.8 %
NONHDLC SERPL-MCNC: 72 MG/DL (ref ?–130)
OSMOLALITY SERPL CALC.SUM OF ELEC: 293 MOSM/KG (ref 275–295)
PLATELET # BLD AUTO: 322 10(3)UL (ref 150–450)
POTASSIUM SERPL-SCNC: 3.7 MMOL/L (ref 3.5–5.1)
RBC # BLD AUTO: 4.38 X10(6)UL (ref 3.8–5.3)
SODIUM SERPL-SCNC: 142 MMOL/L (ref 136–145)
TRIGL SERPL-MCNC: 37 MG/DL (ref 30–149)
TSI SER-ACNC: 0.43 MIU/ML (ref 0.36–3.74)
VLDLC SERPL CALC-MCNC: 7 MG/DL (ref 0–30)
WBC # BLD AUTO: 6 X10(3) UL (ref 4–11)

## 2019-03-28 PROCEDURE — 83036 HEMOGLOBIN GLYCOSYLATED A1C: CPT

## 2019-03-28 PROCEDURE — 36415 COLL VENOUS BLD VENIPUNCTURE: CPT

## 2019-03-28 PROCEDURE — 80053 COMPREHEN METABOLIC PANEL: CPT

## 2019-03-28 PROCEDURE — 85025 COMPLETE CBC W/AUTO DIFF WBC: CPT

## 2019-03-28 PROCEDURE — 84443 ASSAY THYROID STIM HORMONE: CPT

## 2019-03-28 PROCEDURE — 80061 LIPID PANEL: CPT

## 2019-04-16 DIAGNOSIS — K21.9 GASTROESOPHAGEAL REFLUX DISEASE WITHOUT ESOPHAGITIS: ICD-10-CM

## 2019-04-16 RX ORDER — OMEPRAZOLE 40 MG/1
CAPSULE, DELAYED RELEASE ORAL
Qty: 90 CAPSULE | Refills: 1 | Status: SHIPPED | OUTPATIENT
Start: 2019-04-16 | End: 2019-11-19

## 2019-04-16 NOTE — TELEPHONE ENCOUNTER
Name from pharmacy: [address] [address] [facility]  [facility]          Will file in chart as: OMEPRAZOLE 40 MG Oral Capsule Delayed Release    Sig: TAKE 1 CAPSULE BY MOUTH ONCE DAILY    Disp:  30 capsule    Refills:  2    Start: 4/16/2019    Class: Normal    For: Wm. Deann Gao

## 2019-05-06 DIAGNOSIS — E03.9 ACQUIRED HYPOTHYROIDISM: ICD-10-CM

## 2019-05-07 RX ORDER — LEVOTHYROXINE SODIUM 0.05 MG/1
TABLET ORAL
Qty: 90 TABLET | Refills: 1 | Status: SHIPPED | OUTPATIENT
Start: 2019-05-07 | End: 2019-11-19

## 2019-05-07 NOTE — TELEPHONE ENCOUNTER
Name from pharmacy: [address] [place] [address]         Will file in chart as: LEVOTHYROXINE SODIUM 50 MCG Oral Tab    Sig: TAKE 1 TABLET BY MOUTH ONCE DAILY BEFORE BREAKFAST    Disp:  90 tablet    Refills:  0    Start: 5/6/2019    Class: Normal    For: Rosina

## 2019-06-12 ENCOUNTER — OFFICE VISIT (OUTPATIENT)
Dept: FAMILY MEDICINE CLINIC | Facility: CLINIC | Age: 39
End: 2019-06-12
Payer: COMMERCIAL

## 2019-06-12 ENCOUNTER — APPOINTMENT (OUTPATIENT)
Dept: LAB | Age: 39
End: 2019-06-12
Attending: FAMILY MEDICINE
Payer: COMMERCIAL

## 2019-06-12 VITALS
SYSTOLIC BLOOD PRESSURE: 98 MMHG | TEMPERATURE: 98 F | DIASTOLIC BLOOD PRESSURE: 64 MMHG | WEIGHT: 125.5 LBS | BODY MASS INDEX: 20.41 KG/M2 | HEART RATE: 81 BPM | RESPIRATION RATE: 16 BRPM | HEIGHT: 65.59 IN | OXYGEN SATURATION: 99 %

## 2019-06-12 DIAGNOSIS — R63.4 WEIGHT LOSS: ICD-10-CM

## 2019-06-12 DIAGNOSIS — E03.9 ACQUIRED HYPOTHYROIDISM: ICD-10-CM

## 2019-06-12 DIAGNOSIS — R53.83 FATIGUE, UNSPECIFIED TYPE: ICD-10-CM

## 2019-06-12 DIAGNOSIS — K21.9 GASTROESOPHAGEAL REFLUX DISEASE WITHOUT ESOPHAGITIS: ICD-10-CM

## 2019-06-12 DIAGNOSIS — J01.00 ACUTE NON-RECURRENT MAXILLARY SINUSITIS: Primary | ICD-10-CM

## 2019-06-12 DIAGNOSIS — G89.29 CHRONIC RIGHT-SIDED LOW BACK PAIN WITH RIGHT-SIDED SCIATICA: ICD-10-CM

## 2019-06-12 DIAGNOSIS — M54.41 CHRONIC RIGHT-SIDED LOW BACK PAIN WITH RIGHT-SIDED SCIATICA: ICD-10-CM

## 2019-06-12 PROCEDURE — 84439 ASSAY OF FREE THYROXINE: CPT | Performed by: FAMILY MEDICINE

## 2019-06-12 PROCEDURE — 84443 ASSAY THYROID STIM HORMONE: CPT | Performed by: FAMILY MEDICINE

## 2019-06-12 PROCEDURE — 99214 OFFICE O/P EST MOD 30 MIN: CPT | Performed by: FAMILY MEDICINE

## 2019-06-12 RX ORDER — FLUTICASONE PROPIONATE 50 MCG
2 SPRAY, SUSPENSION (ML) NASAL DAILY
Qty: 16 G | Refills: 1 | Status: SHIPPED | OUTPATIENT
Start: 2019-06-12 | End: 2019-08-19 | Stop reason: ALTCHOICE

## 2019-06-12 RX ORDER — AMOXICILLIN 875 MG/1
875 TABLET, COATED ORAL 2 TIMES DAILY
Qty: 20 TABLET | Refills: 0 | Status: SHIPPED | OUTPATIENT
Start: 2019-06-12 | End: 2019-06-22

## 2019-06-12 NOTE — PROGRESS NOTES
Laura Adair is a 46year old female.   HPI:  C/o URI sxs for one week  sxs worse since yesterday  No fevers/chills  Sinus and chest congestion  Facial pain, sinus pressure over cheeks  No sore throat  Cough prod of discolored sputum  No SOB  Taking Dayquil gastritis without mention of hemorrhage    • Allergic rhinitis due to other allergen    • Anemia     during pregnancy/iron transfusions   • GERD (gastroesophageal reflux disease)    • Hypothyroidism    • Rash and other nonspecific skin eruption        Past 5/5.  Negative straight leg raise bilaterally. EXTREMITIES: no cyanosis, clubbing or edema  NEURO: A&O x3, no focal deficits  Normal gait    ASSESSMENT AND PLAN:    1. Acute non-recurrent maxillary sinusitis  Keep well-hydrated.   Tylenol extra strength,q6

## 2019-06-16 PROBLEM — D50.0 IRON DEFICIENCY ANEMIA DUE TO CHRONIC BLOOD LOSS: Status: RESOLVED | Noted: 2017-05-01 | Resolved: 2019-06-16

## 2019-10-11 ENCOUNTER — OFFICE VISIT (OUTPATIENT)
Dept: FAMILY MEDICINE CLINIC | Facility: CLINIC | Age: 39
End: 2019-10-11
Payer: COMMERCIAL

## 2019-10-11 VITALS
WEIGHT: 129.25 LBS | HEIGHT: 65.59 IN | SYSTOLIC BLOOD PRESSURE: 104 MMHG | HEART RATE: 95 BPM | BODY MASS INDEX: 21.02 KG/M2 | RESPIRATION RATE: 16 BRPM | TEMPERATURE: 98 F | DIASTOLIC BLOOD PRESSURE: 60 MMHG | OXYGEN SATURATION: 98 %

## 2019-10-11 DIAGNOSIS — E03.9 HYPOTHYROIDISM (ACQUIRED): ICD-10-CM

## 2019-10-11 DIAGNOSIS — K21.9 GASTROESOPHAGEAL REFLUX DISEASE WITHOUT ESOPHAGITIS: ICD-10-CM

## 2019-10-11 DIAGNOSIS — J06.9 VIRAL URI WITH COUGH: Primary | ICD-10-CM

## 2019-10-11 PROCEDURE — 99213 OFFICE O/P EST LOW 20 MIN: CPT | Performed by: FAMILY MEDICINE

## 2019-10-11 RX ORDER — AZITHROMYCIN 250 MG/1
TABLET, FILM COATED ORAL
Qty: 6 TABLET | Refills: 0 | Status: SHIPPED | OUTPATIENT
Start: 2019-10-11 | End: 2019-10-19 | Stop reason: ALTCHOICE

## 2019-10-11 RX ORDER — CODEINE PHOSPHATE AND GUAIFENESIN 10; 100 MG/5ML; MG/5ML
SOLUTION ORAL
Qty: 180 ML | Refills: 0 | Status: SHIPPED | OUTPATIENT
Start: 2019-10-11 | End: 2019-11-15 | Stop reason: ALTCHOICE

## 2019-10-11 NOTE — PROGRESS NOTES
Cachorro Navarrete is a 46year old female. HPI:  Body aches and cough started 3-4  days ago  Cough is worse since yesterday.    Having fevers up to 101 with chills  Taking Advil /Tylenol prn for fevers and helps  Cough worse at night and now productive of green SYSTEMS:  GENERAL HEALTH: as above  Feeling fine on current dose of Levothyroxine.    SKIN: denies any unusual skin lesions or rashes  RESPIRATORY:as above  CARDIOVASCULAR: denies chest pain on exertion  GI: denies abdominal pain   : normal bladder  NEURO disease without esophagitis  Some flareup with acute URI. Use ibuprofen sparingly. Symptoms improved with taking omeprazole on a daily basis now. Recommend take daily for 2-3 weeks, then can resume daily as needed. Avoid possible dietary triggers.

## 2019-11-19 DIAGNOSIS — E03.9 ACQUIRED HYPOTHYROIDISM: ICD-10-CM

## 2019-11-19 DIAGNOSIS — K21.9 GASTROESOPHAGEAL REFLUX DISEASE WITHOUT ESOPHAGITIS: ICD-10-CM

## 2019-11-19 RX ORDER — OMEPRAZOLE 40 MG/1
CAPSULE, DELAYED RELEASE ORAL
Qty: 90 CAPSULE | Refills: 0 | Status: SHIPPED | OUTPATIENT
Start: 2019-11-19 | End: 2020-03-17

## 2019-11-19 RX ORDER — LEVOTHYROXINE SODIUM 50 UG/1
TABLET ORAL
Qty: 90 TABLET | Refills: 1 | Status: SHIPPED | OUTPATIENT
Start: 2019-11-19 | End: 2020-06-04

## 2019-11-19 NOTE — TELEPHONE ENCOUNTER
LOV 10/19    LAST LAB 6/19    LAST RX   Levothyroxine Sodium 50 MCG Oral Tab 90 tablet 1 5/7/2019     OMEPRAZOLE 40 MG Oral Capsule Delayed Release 90 capsule 1 4/16/2019         Next OV Visit date not found      PROTOCOL    Thyroid Supplements Protocol Pa

## 2020-01-20 ENCOUNTER — TELEPHONE (OUTPATIENT)
Dept: FAMILY MEDICINE CLINIC | Facility: CLINIC | Age: 40
End: 2020-01-20

## 2020-01-20 NOTE — TELEPHONE ENCOUNTER
Pt called stating she is having an issue with Acid Reflux (on medication for it). She has been out of town, diet no good. Said upper middle abdomen feels stiff & hard.   Since back from vacation she's been trying to correct her diet, helps a little but tod

## 2020-01-20 NOTE — TELEPHONE ENCOUNTER
Returned call to patient who states she is actually feeling better this afternoon. Reviewed recommendations to take Omeprazole 2x today only and follow up appt for tomorrow. Advised to keep HOB elevated as HS, avoid trigger foods.   Appt scheduled for nazario

## 2020-01-20 NOTE — TELEPHONE ENCOUNTER
Can increase Omeprazole to bid for today and advise dietary precautions until appt nazario. If any n/v, melena, rectal bleeding or increased abd pain should go to UC today.

## 2020-01-20 NOTE — TELEPHONE ENCOUNTER
Dr. Linwood Green,  Please advise    I can schedule pt to see Dr. Amanda Luque tomorrow. Any recommendation other than, tums/rolaids, milk, HOB elevated, avoid food triggers.

## 2020-02-16 ENCOUNTER — TELEPHONE (OUTPATIENT)
Dept: INTERNAL MEDICINE CLINIC | Facility: CLINIC | Age: 40
End: 2020-02-16

## 2020-02-16 RX ORDER — OSELTAMIVIR PHOSPHATE 75 MG/1
75 CAPSULE ORAL DAILY
Qty: 14 CAPSULE | Refills: 0 | Status: SHIPPED | OUTPATIENT
Start: 2020-02-16 | End: 2020-03-01

## 2020-02-16 NOTE — TELEPHONE ENCOUNTER
Patient called stating two of her children were diagnosed with influenza and patient would like prophylaxis herself. No symptoms. Explained to patient prophylaxis is recommended for patients considered high-risk for complications from influenza.  Patient ac

## 2020-02-24 ENCOUNTER — OFFICE VISIT (OUTPATIENT)
Dept: FAMILY MEDICINE CLINIC | Facility: CLINIC | Age: 40
End: 2020-02-24
Payer: COMMERCIAL

## 2020-02-24 ENCOUNTER — HOSPITAL ENCOUNTER (OUTPATIENT)
Dept: GENERAL RADIOLOGY | Age: 40
Discharge: HOME | End: 2020-02-24
Attending: FAMILY MEDICINE
Payer: COMMERCIAL

## 2020-02-24 VITALS
WEIGHT: 132 LBS | OXYGEN SATURATION: 98 % | HEART RATE: 79 BPM | DIASTOLIC BLOOD PRESSURE: 68 MMHG | SYSTOLIC BLOOD PRESSURE: 98 MMHG | BODY MASS INDEX: 21.47 KG/M2 | HEIGHT: 65.59 IN | RESPIRATION RATE: 18 BRPM | TEMPERATURE: 98 F

## 2020-02-24 DIAGNOSIS — J11.1 INFLUENZA: Primary | ICD-10-CM

## 2020-02-24 DIAGNOSIS — J20.8 ACUTE BRONCHITIS DUE TO OTHER SPECIFIED ORGANISMS: ICD-10-CM

## 2020-02-24 DIAGNOSIS — J11.1 INFLUENZA: ICD-10-CM

## 2020-02-24 DIAGNOSIS — K21.9 GASTROESOPHAGEAL REFLUX DISEASE WITHOUT ESOPHAGITIS: ICD-10-CM

## 2020-02-24 DIAGNOSIS — S29.011A MUSCLE STRAIN OF CHEST WALL, INITIAL ENCOUNTER: ICD-10-CM

## 2020-02-24 PROCEDURE — 71046 X-RAY EXAM CHEST 2 VIEWS: CPT | Performed by: FAMILY MEDICINE

## 2020-02-24 PROCEDURE — 99214 OFFICE O/P EST MOD 30 MIN: CPT | Performed by: FAMILY MEDICINE

## 2020-02-24 RX ORDER — CODEINE PHOSPHATE AND GUAIFENESIN 10; 100 MG/5ML; MG/5ML
SOLUTION ORAL
Qty: 180 ML | Refills: 0 | Status: SHIPPED | OUTPATIENT
Start: 2020-02-24 | End: 2020-06-23 | Stop reason: ALTCHOICE

## 2020-02-24 NOTE — PROGRESS NOTES
Antonia Aguirre is a 46year old female.   HPI:  Pt is here with persistent URI sxs with cough for 4 days  Has acute onset of bodyaches/chills/fevers/sore throat, bilat ear pain  All 3 of her kids were diagnosed with Influenza B recently  Pt was prescribed Alex to other allergen    • Anemia     during pregnancy/iron transfusions   • GERD (gastroesophageal reflux disease)    • Hypothyroidism    • Rash and other nonspecific skin eruption        Past Surgical History:   Procedure Laterality Date   •   2009 tenderness  EXTREMITIES: no cyanosis, clubbing or edema  NEURO: A&O x3, no focal deficits  Normal gait    ASSESSMENT AND PLAN:    1. Influenza  2.  Acute bronchitis due to other specified organisms  Patient did not tolerate Tamiflu after 2 days of medicatio

## 2020-03-17 DIAGNOSIS — K21.9 GASTROESOPHAGEAL REFLUX DISEASE WITHOUT ESOPHAGITIS: ICD-10-CM

## 2020-03-17 RX ORDER — OMEPRAZOLE 40 MG/1
CAPSULE, DELAYED RELEASE ORAL
Qty: 90 CAPSULE | Refills: 0 | Status: SHIPPED | OUTPATIENT
Start: 2020-03-17 | End: 2020-07-02

## 2020-03-17 NOTE — TELEPHONE ENCOUNTER
LOV 2/24/2020      LAST LAB none    LAST RX 11/19/19 90 capsule 0 refills    Next OV   Future Appointments   Date Time Provider Dominique Melody   3/30/2020 12:20 PM Jamal Arora MD [facility]         PROTOCOL none    Name from pharmacy: [facility]

## 2020-06-03 DIAGNOSIS — E03.9 ACQUIRED HYPOTHYROIDISM: ICD-10-CM

## 2020-06-03 NOTE — TELEPHONE ENCOUNTER
Thyroid Supplements Protocol Passed6/3 3:11 PM   TSH test in past 12 months    TSH value between 0.350 and 5.500 IU/ml    Appointment in past 12 or next 3 months     LOV 10/19/2019     LAST LAB 6/12/2019     LAST RX  11/19/2019     Next OV No future appoin

## 2020-06-04 RX ORDER — LEVOTHYROXINE SODIUM 50 UG/1
TABLET ORAL
Qty: 30 TABLET | Refills: 0 | Status: SHIPPED | OUTPATIENT
Start: 2020-06-04 | End: 2020-07-02

## 2020-06-04 NOTE — TELEPHONE ENCOUNTER
Future Appointments   Date Time Provider Dominique Melody   6/23/2020 11:00 AM Carol Parada MD [facility]     Please send short term refill

## 2020-06-23 ENCOUNTER — OFFICE VISIT (OUTPATIENT)
Dept: FAMILY MEDICINE CLINIC | Facility: CLINIC | Age: 40
End: 2020-06-23
Payer: COMMERCIAL

## 2020-06-23 VITALS
TEMPERATURE: 98 F | HEIGHT: 65.59 IN | RESPIRATION RATE: 16 BRPM | DIASTOLIC BLOOD PRESSURE: 64 MMHG | SYSTOLIC BLOOD PRESSURE: 100 MMHG | WEIGHT: 133.5 LBS | OXYGEN SATURATION: 98 % | HEART RATE: 75 BPM | BODY MASS INDEX: 21.71 KG/M2

## 2020-06-23 DIAGNOSIS — N39.3 SUI (STRESS URINARY INCONTINENCE, FEMALE): ICD-10-CM

## 2020-06-23 DIAGNOSIS — E03.9 HYPOTHYROIDISM (ACQUIRED): ICD-10-CM

## 2020-06-23 DIAGNOSIS — Z83.3 FAMILY HISTORY OF DIABETES MELLITUS: ICD-10-CM

## 2020-06-23 DIAGNOSIS — K21.9 GASTROESOPHAGEAL REFLUX DISEASE WITHOUT ESOPHAGITIS: ICD-10-CM

## 2020-06-23 DIAGNOSIS — Z01.419 ROUTINE GYNECOLOGICAL EXAMINATION: Primary | ICD-10-CM

## 2020-06-23 DIAGNOSIS — R23.8 EASY BRUISING: ICD-10-CM

## 2020-06-23 DIAGNOSIS — Z00.00 LABORATORY EXAMINATION ORDERED AS PART OF A COMPLETE PHYSICAL EXAMINATION: ICD-10-CM

## 2020-06-23 DIAGNOSIS — Z80.3 FAMILY HISTORY OF BREAST CANCER IN FEMALE: ICD-10-CM

## 2020-06-23 DIAGNOSIS — L85.3 DRY SKIN DERMATITIS: ICD-10-CM

## 2020-06-23 PROCEDURE — 99395 PREV VISIT EST AGE 18-39: CPT | Performed by: FAMILY MEDICINE

## 2020-06-23 PROCEDURE — 87624 HPV HI-RISK TYP POOLED RSLT: CPT | Performed by: FAMILY MEDICINE

## 2020-06-23 RX ORDER — AMMONIUM LACTATE 12 G/100G
LOTION TOPICAL
Qty: 500 G | Refills: 1 | Status: SHIPPED | OUTPATIENT
Start: 2020-06-23 | End: 2021-06-30 | Stop reason: ALTCHOICE

## 2020-06-23 NOTE — PROGRESS NOTES
Jose Alonzo is a 45year old female. HPI:  Patient is here for physical/well woman exam  Pt states doing well overall  Taking Omeprazole daily prn  Sometimes will need regularly for a few days then is fine without medication for 1-2 weeks at a time.   Tri Multiple Vitamin (DAILY VALUE MULTIVITAMIN) Oral Tab Take by mouth daily. • Cholecalciferol (VITAMIN D-3) 5000 units Oral Tab Take 1 tablet by mouth 3 (three) times a week.            Past Medical History:   Diagnosis Date   • Acute gastritis without me drainage. Postinflammatory hyperpigmentation noted with superficial excoriations due to scratching, primarily on back and distal lower extremities.   HEENT: atraumatic, normocephalic,  Conj clear, EOMI, PERRL  TMs:clear bilat  OMM, throat clear  Nasal muco screening mammogram, recommended at age 37, sooner if notes any breast symptoms or change in breast self-exam.    4. Family history of diabetes mellitus  - HEMOGLOBIN A1C; Future    5.  Hypothyroidism (acquired)  Clinically doing well on current thyroid med

## 2020-06-29 ENCOUNTER — LAB ENCOUNTER (OUTPATIENT)
Dept: LAB | Age: 40
End: 2020-06-29
Attending: FAMILY MEDICINE
Payer: COMMERCIAL

## 2020-06-29 DIAGNOSIS — Z83.3 FAMILY HISTORY OF DIABETES MELLITUS: ICD-10-CM

## 2020-06-29 DIAGNOSIS — E03.9 HYPOTHYROIDISM (ACQUIRED): ICD-10-CM

## 2020-06-29 DIAGNOSIS — Z00.00 LABORATORY EXAMINATION ORDERED AS PART OF A COMPLETE PHYSICAL EXAMINATION: ICD-10-CM

## 2020-06-29 DIAGNOSIS — R23.8 EASY BRUISING: ICD-10-CM

## 2020-06-29 LAB
ALBUMIN SERPL-MCNC: 3.5 G/DL (ref 3.4–5)
ALBUMIN/GLOB SERPL: 1 {RATIO} (ref 1–2)
ALP LIVER SERPL-CCNC: 47 U/L (ref 37–98)
ALT SERPL-CCNC: 20 U/L (ref 13–56)
ANION GAP SERPL CALC-SCNC: 3 MMOL/L (ref 0–18)
APTT PPP: 34.3 SECONDS (ref 25.4–36.1)
AST SERPL-CCNC: 14 U/L (ref 15–37)
BASOPHILS # BLD AUTO: 0.06 X10(3) UL (ref 0–0.2)
BASOPHILS NFR BLD AUTO: 1 %
BILIRUB SERPL-MCNC: 0.6 MG/DL (ref 0.1–2)
BUN BLD-MCNC: 12 MG/DL (ref 7–18)
BUN/CREAT SERPL: 18.8 (ref 10–20)
CALCIUM BLD-MCNC: 8.5 MG/DL (ref 8.5–10.1)
CHLORIDE SERPL-SCNC: 110 MMOL/L (ref 98–112)
CHOLEST SMN-MCNC: 125 MG/DL (ref ?–200)
CO2 SERPL-SCNC: 27 MMOL/L (ref 21–32)
CREAT BLD-MCNC: 0.64 MG/DL (ref 0.55–1.02)
DEPRECATED RDW RBC AUTO: 41.7 FL (ref 35.1–46.3)
EOSINOPHIL # BLD AUTO: 0.15 X10(3) UL (ref 0–0.7)
EOSINOPHIL NFR BLD AUTO: 2.4 %
ERYTHROCYTE [DISTWIDTH] IN BLOOD BY AUTOMATED COUNT: 12.9 % (ref 11–15)
EST. AVERAGE GLUCOSE BLD GHB EST-MCNC: 111 MG/DL (ref 68–126)
GLOBULIN PLAS-MCNC: 3.5 G/DL (ref 2.8–4.4)
GLUCOSE BLD-MCNC: 98 MG/DL (ref 70–99)
HBA1C MFR BLD HPLC: 5.5 % (ref ?–5.7)
HCT VFR BLD AUTO: 38.5 % (ref 35–48)
HDLC SERPL-MCNC: 40 MG/DL (ref 40–59)
HGB BLD-MCNC: 12.2 G/DL (ref 12–16)
IMM GRANULOCYTES # BLD AUTO: 0.01 X10(3) UL (ref 0–1)
IMM GRANULOCYTES NFR BLD: 0.2 %
INR BLD: 1.05 (ref 0.89–1.11)
LDLC SERPL CALC-MCNC: 75 MG/DL (ref ?–100)
LYMPHOCYTES # BLD AUTO: 2.46 X10(3) UL (ref 1–4)
LYMPHOCYTES NFR BLD AUTO: 39.7 %
M PROTEIN MFR SERPL ELPH: 7 G/DL (ref 6.4–8.2)
MCH RBC QN AUTO: 27.8 PG (ref 26–34)
MCHC RBC AUTO-ENTMCNC: 31.7 G/DL (ref 31–37)
MCV RBC AUTO: 87.7 FL (ref 80–100)
MONOCYTES # BLD AUTO: 0.39 X10(3) UL (ref 0.1–1)
MONOCYTES NFR BLD AUTO: 6.3 %
NEUTROPHILS # BLD AUTO: 3.12 X10 (3) UL (ref 1.5–7.7)
NEUTROPHILS # BLD AUTO: 3.12 X10(3) UL (ref 1.5–7.7)
NEUTROPHILS NFR BLD AUTO: 50.4 %
NONHDLC SERPL-MCNC: 85 MG/DL (ref ?–130)
OSMOLALITY SERPL CALC.SUM OF ELEC: 290 MOSM/KG (ref 275–295)
PATIENT FASTING Y/N/NP: YES
PATIENT FASTING Y/N/NP: YES
PLATELET # BLD AUTO: 279 10(3)UL (ref 150–450)
POTASSIUM SERPL-SCNC: 4 MMOL/L (ref 3.5–5.1)
PSA SERPL DL<=0.01 NG/ML-MCNC: 14 SECONDS (ref 12.4–14.6)
RBC # BLD AUTO: 4.39 X10(6)UL (ref 3.8–5.3)
SODIUM SERPL-SCNC: 140 MMOL/L (ref 136–145)
T4 FREE SERPL-MCNC: 1 NG/DL (ref 0.8–1.7)
TRIGL SERPL-MCNC: 52 MG/DL (ref 30–149)
TSI SER-ACNC: 0.39 MIU/ML (ref 0.36–3.74)
VLDLC SERPL CALC-MCNC: 10 MG/DL (ref 0–30)
WBC # BLD AUTO: 6.2 X10(3) UL (ref 4–11)

## 2020-06-29 PROCEDURE — 83036 HEMOGLOBIN GLYCOSYLATED A1C: CPT | Performed by: FAMILY MEDICINE

## 2020-06-29 PROCEDURE — 36415 COLL VENOUS BLD VENIPUNCTURE: CPT | Performed by: FAMILY MEDICINE

## 2020-06-29 PROCEDURE — 84439 ASSAY OF FREE THYROXINE: CPT | Performed by: FAMILY MEDICINE

## 2020-06-29 PROCEDURE — 85610 PROTHROMBIN TIME: CPT | Performed by: FAMILY MEDICINE

## 2020-06-29 PROCEDURE — 85730 THROMBOPLASTIN TIME PARTIAL: CPT | Performed by: FAMILY MEDICINE

## 2020-06-29 PROCEDURE — 80050 GENERAL HEALTH PANEL: CPT | Performed by: FAMILY MEDICINE

## 2020-06-29 PROCEDURE — 80061 LIPID PANEL: CPT | Performed by: FAMILY MEDICINE

## 2020-07-02 DIAGNOSIS — E03.9 ACQUIRED HYPOTHYROIDISM: ICD-10-CM

## 2020-07-02 DIAGNOSIS — K21.9 GASTROESOPHAGEAL REFLUX DISEASE WITHOUT ESOPHAGITIS: ICD-10-CM

## 2020-07-02 RX ORDER — OMEPRAZOLE 40 MG/1
CAPSULE, DELAYED RELEASE ORAL
Qty: 90 CAPSULE | Refills: 0 | Status: SHIPPED | OUTPATIENT
Start: 2020-07-02 | End: 2020-11-05

## 2020-07-02 RX ORDER — LEVOTHYROXINE SODIUM 50 UG/1
TABLET ORAL
Qty: 90 TABLET | Refills: 1 | Status: SHIPPED | OUTPATIENT
Start: 2020-07-02 | End: 2021-02-04

## 2020-07-02 NOTE — TELEPHONE ENCOUNTER
LOV 6/23/20    LAST LAB 6/29/20    LAST RX   EUTHYROX 50 MCG Oral Tab 30 tablet 0 6/4/2020    Sig:   TAKE 1 TABLET BY MOUTH ONCE DAILY BEFORE BREAKFAST       OMEPRAZOLE 40 MG Oral Capsule Delayed Release 90 capsule 0 3/17/2020    Sig:   Take 1 capsule by m

## 2020-07-15 ENCOUNTER — TELEPHONE (OUTPATIENT)
Dept: FAMILY MEDICINE CLINIC | Facility: CLINIC | Age: 40
End: 2020-07-15

## 2020-07-15 NOTE — TELEPHONE ENCOUNTER
Called patient who states her  removed the tick and she only has slight tenderness at site when she touches it. States was working out in the yard pulling weeks and cleaning her flower beds. Had a stiff neck afterwards, but that went away.  Area is

## 2020-07-15 NOTE — TELEPHONE ENCOUNTER
Patient said she found a tick on her leg yesterday , she had her  take it out she said it has been stinging , but is better today . Patient wants to know if she should come in and see her .     Not other symptoms, no travel

## 2020-10-06 ENCOUNTER — TELEPHONE (OUTPATIENT)
Dept: FAMILY MEDICINE CLINIC | Facility: CLINIC | Age: 40
End: 2020-10-06

## 2020-10-06 NOTE — TELEPHONE ENCOUNTER
Called pt and informed her Dr. Shanelle Barnes is out of the office until 10/16/2020. Pt made aware we will ask Dr. Shanelle Barnes once she has returned. Pt verbalized understanding.

## 2020-10-06 NOTE — TELEPHONE ENCOUNTER
Patient called and stated her mother in law and  is a patient of Dr. Luca Harper. She is asking if she could established with Dr. Luca Harper. Please call her back at [phone].

## 2020-10-16 NOTE — TELEPHONE ENCOUNTER
Patient's mother-in-law and  are a patient of yours. Patient is requesting to be established with you. Please adivse.

## 2020-10-21 ENCOUNTER — TELEPHONE (OUTPATIENT)
Dept: FAMILY MEDICINE CLINIC | Facility: CLINIC | Age: 40
End: 2020-10-21

## 2020-10-21 DIAGNOSIS — R52 GENERALIZED BODY ACHES: ICD-10-CM

## 2020-10-21 DIAGNOSIS — Z20.822 ENCOUNTER FOR LABORATORY TESTING FOR COVID-19 VIRUS: Primary | ICD-10-CM

## 2020-10-21 DIAGNOSIS — R50.9 FEVER, UNSPECIFIED FEVER CAUSE: ICD-10-CM

## 2020-10-21 NOTE — TELEPHONE ENCOUNTER
Called patient who states her symptoms started yesterday with sore throat, Headache, and body aches which kept her from sleeping. Has low grade temp of 99.4. Denies cough congestion or SOB. Has been taking tylenol and advil which only help a little.   No k

## 2020-10-21 NOTE — TELEPHONE ENCOUNTER
Pt called stating he following:    Developed body aches, slight fever & sore throat & headaches yesterday. Taking Tylenol which helps a little. Pt worried about her elderly parents living with her.   She said she has other family members in the [facility]

## 2020-10-21 NOTE — TELEPHONE ENCOUNTER
Orders signed for testing  pls check on pt status tomorrow. 10/22. Continue with supportive measures as per nursing recs. Can do video visit with one of the doctors in the office if needed. Rec ER eval if any resp sxs develop.

## 2020-10-22 ENCOUNTER — HOSPITAL ENCOUNTER (EMERGENCY)
Age: 40
Discharge: HOME | End: 2020-10-22
Attending: EMERGENCY MEDICINE
Payer: COMMERCIAL

## 2020-10-22 VITALS
DIASTOLIC BLOOD PRESSURE: 61 MMHG | HEART RATE: 64 BPM | RESPIRATION RATE: 16 BRPM | SYSTOLIC BLOOD PRESSURE: 114 MMHG | OXYGEN SATURATION: 100 % | BODY MASS INDEX: 21.86 KG/M2 | HEIGHT: 66 IN | TEMPERATURE: 100 F | WEIGHT: 136 LBS

## 2020-10-22 DIAGNOSIS — R11.2 NAUSEA AND VOMITING IN ADULT: Primary | ICD-10-CM

## 2020-10-22 DIAGNOSIS — Z20.822 PERSON UNDER INVESTIGATION FOR COVID-19: ICD-10-CM

## 2020-10-22 DIAGNOSIS — N39.0 URINARY TRACT INFECTION WITHOUT HEMATURIA, SITE UNSPECIFIED: ICD-10-CM

## 2020-10-22 DIAGNOSIS — E86.0 DEHYDRATION: ICD-10-CM

## 2020-10-22 PROCEDURE — 96375 TX/PRO/DX INJ NEW DRUG ADDON: CPT

## 2020-10-22 PROCEDURE — 87086 URINE CULTURE/COLONY COUNT: CPT | Performed by: EMERGENCY MEDICINE

## 2020-10-22 PROCEDURE — 96365 THER/PROPH/DIAG IV INF INIT: CPT

## 2020-10-22 PROCEDURE — 99284 EMERGENCY DEPT VISIT MOD MDM: CPT

## 2020-10-22 PROCEDURE — 80053 COMPREHEN METABOLIC PANEL: CPT | Performed by: EMERGENCY MEDICINE

## 2020-10-22 PROCEDURE — 81025 URINE PREGNANCY TEST: CPT

## 2020-10-22 PROCEDURE — 85025 COMPLETE CBC W/AUTO DIFF WBC: CPT | Performed by: EMERGENCY MEDICINE

## 2020-10-22 PROCEDURE — 96361 HYDRATE IV INFUSION ADD-ON: CPT

## 2020-10-22 PROCEDURE — 81001 URINALYSIS AUTO W/SCOPE: CPT | Performed by: EMERGENCY MEDICINE

## 2020-10-22 RX ORDER — ONDANSETRON 8 MG/1
8 TABLET, ORALLY DISINTEGRATING ORAL EVERY 6 HOURS PRN
Qty: 10 TABLET | Refills: 0 | Status: SHIPPED | OUTPATIENT
Start: 2020-10-22 | End: 2021-06-30 | Stop reason: ALTCHOICE

## 2020-10-22 RX ORDER — SULFAMETHOXAZOLE AND TRIMETHOPRIM 800; 160 MG/1; MG/1
1 TABLET ORAL 2 TIMES DAILY
Qty: 28 TABLET | Refills: 0 | Status: SHIPPED | OUTPATIENT
Start: 2020-10-22 | End: 2020-11-05

## 2020-10-22 RX ORDER — ACETAMINOPHEN 500 MG
1000 TABLET ORAL ONCE
Status: COMPLETED | OUTPATIENT
Start: 2020-10-22 | End: 2020-10-22

## 2020-10-22 RX ORDER — ONDANSETRON 2 MG/ML
4 INJECTION INTRAMUSCULAR; INTRAVENOUS
Status: DISCONTINUED | OUTPATIENT
Start: 2020-10-22 | End: 2020-10-22

## 2020-10-22 NOTE — ED PROVIDER NOTES
Patient Seen in: THE [facility] In [place]      History   Patient presents with:  Nausea/Vomiting/Diarrhea  Headache    Stated Complaint: 3days of low grade fever body aches headaches and vomited twice this am    HPI    I reviewed a telep 114/72   Pulse 92   Resp 14   Temp 99.8 °F (37.7 °C)   Temp src Temporal   SpO2 100 %   O2 Device None (Room air)       Current:/80   Pulse 74   Temp 99.8 °F (37.7 °C) (Temporal)   Resp 16   Ht 167.6 cm (5' 6\")   Wt 61.7 kg   LMP 05/21/2020   SpO2 1 -----------         ------                     CBC W/ DIFFERENTIAL[[id number]]                              Final result                 Please view results for these tests on the individual orders.    POCT PREGNANCY, URINE   SARS every 6 (six) hours as needed for Nausea. Qty: 10 tablet Refills: 0    Sulfamethoxazole-TMP -160 MG Oral Tab per tablet  Take 1 tablet by mouth 2 (two) times daily for 14 days.   Qty: 28 tablet Refills: 0

## 2020-10-22 NOTE — ED INITIAL ASSESSMENT (HPI)
Urinary frequency increasing with urgency for over 1 wk-- body aches and headache for 3 days-- emesis this am

## 2020-11-05 ENCOUNTER — OFFICE VISIT (OUTPATIENT)
Dept: FAMILY MEDICINE CLINIC | Facility: CLINIC | Age: 40
End: 2020-11-05
Payer: COMMERCIAL

## 2020-11-05 VITALS
HEART RATE: 79 BPM | WEIGHT: 135 LBS | DIASTOLIC BLOOD PRESSURE: 72 MMHG | BODY MASS INDEX: 22.22 KG/M2 | SYSTOLIC BLOOD PRESSURE: 116 MMHG | HEIGHT: 65.5 IN | TEMPERATURE: 98 F | OXYGEN SATURATION: 98 % | RESPIRATION RATE: 14 BRPM

## 2020-11-05 DIAGNOSIS — G43.829 MENSTRUAL MIGRAINE WITHOUT STATUS MIGRAINOSUS, NOT INTRACTABLE: Primary | ICD-10-CM

## 2020-11-05 DIAGNOSIS — R63.5 WEIGHT GAIN: ICD-10-CM

## 2020-11-05 DIAGNOSIS — E03.9 ACQUIRED HYPOTHYROIDISM: ICD-10-CM

## 2020-11-05 DIAGNOSIS — K21.9 GASTROESOPHAGEAL REFLUX DISEASE WITHOUT ESOPHAGITIS: ICD-10-CM

## 2020-11-05 DIAGNOSIS — Z83.3 FAMILY HISTORY OF DIABETES MELLITUS: ICD-10-CM

## 2020-11-05 PROCEDURE — 3008F BODY MASS INDEX DOCD: CPT | Performed by: FAMILY MEDICINE

## 2020-11-05 PROCEDURE — 99214 OFFICE O/P EST MOD 30 MIN: CPT | Performed by: FAMILY MEDICINE

## 2020-11-05 PROCEDURE — 99072 ADDL SUPL MATRL&STAF TM PHE: CPT | Performed by: FAMILY MEDICINE

## 2020-11-05 PROCEDURE — 3074F SYST BP LT 130 MM HG: CPT | Performed by: FAMILY MEDICINE

## 2020-11-05 PROCEDURE — 3078F DIAST BP <80 MM HG: CPT | Performed by: FAMILY MEDICINE

## 2020-11-05 RX ORDER — SUMATRIPTAN 50 MG/1
TABLET, FILM COATED ORAL
Qty: 10 TABLET | Refills: 5 | Status: SHIPPED | OUTPATIENT
Start: 2020-11-05

## 2020-11-05 RX ORDER — OMEPRAZOLE 40 MG/1
40 CAPSULE, DELAYED RELEASE ORAL DAILY
Qty: 90 CAPSULE | Refills: 3 | Status: SHIPPED | OUTPATIENT
Start: 2020-11-05

## 2020-11-05 NOTE — PROGRESS NOTES
[facility] Office Note  Chief Complaint:   Patient presents with:  Weight Check: c/o weight gain x5-6 months  Thyroid Problem: body pain x5-6 months with headaches around menstraul cycle       HPI:   This is a 45year old female c Oral Tab 1 tablet by mouth once. May repeat in 2 hr if needed. Max dose 200mg/24 hr 10 tablet 5   • ondansetron 8 MG Oral Tablet Dispersible Take 1 tablet (8 mg total) by mouth every 6 (six) hours as needed for Nausea.  10 tablet 0   • EUTHYROX 50 MCG Oral icterus. Neck: Neck supple. No thyroid mass and no thyromegaly present. Cardiovascular: Normal rate, regular rhythm and normal pulses. No murmur heard. Pulmonary/Chest: Effort normal and breath sounds normal.   Abdominal: Soft.  Bowel sounds are norm Education: There are no barriers to learning. Medical education done. Outcome: Patient verbalizes understanding. Patient is notified to call with any questions, complications, allergies, or worsening or changing symptoms.   Patient is to call with any zoila

## 2020-11-07 ENCOUNTER — LAB ENCOUNTER (OUTPATIENT)
Dept: LAB | Age: 40
End: 2020-11-07
Attending: FAMILY MEDICINE
Payer: COMMERCIAL

## 2020-11-07 DIAGNOSIS — R63.5 WEIGHT GAIN: ICD-10-CM

## 2020-11-07 DIAGNOSIS — Z83.3 FAMILY HISTORY OF DIABETES MELLITUS: ICD-10-CM

## 2020-11-07 DIAGNOSIS — E03.9 ACQUIRED HYPOTHYROIDISM: ICD-10-CM

## 2020-11-07 PROCEDURE — 83036 HEMOGLOBIN GLYCOSYLATED A1C: CPT | Performed by: FAMILY MEDICINE

## 2020-11-07 PROCEDURE — 84439 ASSAY OF FREE THYROXINE: CPT | Performed by: FAMILY MEDICINE

## 2020-11-07 PROCEDURE — 80053 COMPREHEN METABOLIC PANEL: CPT | Performed by: FAMILY MEDICINE

## 2020-11-07 PROCEDURE — 84443 ASSAY THYROID STIM HORMONE: CPT | Performed by: FAMILY MEDICINE

## 2020-11-07 PROCEDURE — 36415 COLL VENOUS BLD VENIPUNCTURE: CPT | Performed by: FAMILY MEDICINE

## 2020-11-07 PROCEDURE — 80061 LIPID PANEL: CPT | Performed by: FAMILY MEDICINE

## 2021-02-04 DIAGNOSIS — E03.9 ACQUIRED HYPOTHYROIDISM: ICD-10-CM

## 2021-02-04 DIAGNOSIS — R73.03 PREDIABETES: Primary | ICD-10-CM

## 2021-02-04 RX ORDER — LEVOTHYROXINE SODIUM 50 UG/1
TABLET ORAL
Qty: 90 TABLET | Refills: 0 | Status: SHIPPED | OUTPATIENT
Start: 2021-02-04 | End: 2021-02-15

## 2021-02-04 NOTE — TELEPHONE ENCOUNTER
LOV 6/23/2020    LAST LAB 11/7/2020    LAST RX   EUTHYROX 50 MCG Oral Tab 90 tablet 1 7/2/2020         Next OV No future appointments.       PROTOCOL failed

## 2021-02-08 ENCOUNTER — TELEPHONE (OUTPATIENT)
Dept: FAMILY MEDICINE CLINIC | Facility: CLINIC | Age: 41
End: 2021-02-08

## 2021-02-08 NOTE — TELEPHONE ENCOUNTER
Spoke with the patient via phone. Answered the patient's questions regarding lab work ordered on 2/4/2021. Notified the patient that she does not need to fast for the blood work. Notified the patient that the lab is accepting walk-ins.   Patient states that

## 2021-02-11 ENCOUNTER — LAB ENCOUNTER (OUTPATIENT)
Dept: LAB | Age: 41
End: 2021-02-11
Attending: FAMILY MEDICINE
Payer: COMMERCIAL

## 2021-02-11 DIAGNOSIS — E03.9 ACQUIRED HYPOTHYROIDISM: ICD-10-CM

## 2021-02-11 DIAGNOSIS — R73.03 PREDIABETES: ICD-10-CM

## 2021-02-11 LAB
EST. AVERAGE GLUCOSE BLD GHB EST-MCNC: 117 MG/DL (ref 68–126)
HBA1C MFR BLD HPLC: 5.7 % (ref ?–5.7)
T4 FREE SERPL-MCNC: 0.9 NG/DL (ref 0.8–1.7)
TSI SER-ACNC: 0.82 MIU/ML (ref 0.36–3.74)

## 2021-02-11 PROCEDURE — 83036 HEMOGLOBIN GLYCOSYLATED A1C: CPT | Performed by: FAMILY MEDICINE

## 2021-02-11 PROCEDURE — 84443 ASSAY THYROID STIM HORMONE: CPT | Performed by: FAMILY MEDICINE

## 2021-02-11 PROCEDURE — 36415 COLL VENOUS BLD VENIPUNCTURE: CPT | Performed by: FAMILY MEDICINE

## 2021-02-11 PROCEDURE — 84439 ASSAY OF FREE THYROXINE: CPT | Performed by: FAMILY MEDICINE

## 2021-02-12 ENCOUNTER — TELEPHONE (OUTPATIENT)
Dept: FAMILY MEDICINE CLINIC | Facility: CLINIC | Age: 41
End: 2021-02-12

## 2021-02-12 DIAGNOSIS — E03.9 ACQUIRED HYPOTHYROIDISM: Primary | ICD-10-CM

## 2021-02-15 NOTE — TELEPHONE ENCOUNTER
Spoke with the patient via phone. Patient states that she stopped taking the Euthyrox approximately three months ago when her father passed away. Patient states that she started to take the Euthyrox approximately twice a week for a couple of weeks.   The

## 2021-02-15 NOTE — TELEPHONE ENCOUNTER
Euthyrox discontinued. Lab order placed. Spoke with the patient via phone. Notified the patient of discontinued Euthyrox and repeat lab draw in 3 months. Patient verbalized understanding. Answered all questions at this time.

## 2021-06-16 ENCOUNTER — LAB ENCOUNTER (OUTPATIENT)
Dept: LAB | Age: 41
End: 2021-06-16
Attending: FAMILY MEDICINE
Payer: COMMERCIAL

## 2021-06-16 DIAGNOSIS — E03.9 ACQUIRED HYPOTHYROIDISM: ICD-10-CM

## 2021-06-16 PROCEDURE — 84439 ASSAY OF FREE THYROXINE: CPT | Performed by: FAMILY MEDICINE

## 2021-06-16 PROCEDURE — 84443 ASSAY THYROID STIM HORMONE: CPT | Performed by: FAMILY MEDICINE

## 2021-06-17 ENCOUNTER — TELEPHONE (OUTPATIENT)
Dept: FAMILY MEDICINE CLINIC | Facility: CLINIC | Age: 41
End: 2021-06-17

## 2021-06-17 NOTE — TELEPHONE ENCOUNTER
----- Message from Agueda Young MD sent at 6/17/2021  9:10 AM CDT -----  Results reviewed. Tests show normal thyroid labs. Not on any medications, and does not need to start anything  Please inform patient.

## 2021-06-17 NOTE — TELEPHONE ENCOUNTER
Notified the patient of the below lab results and recommendation. Patient verbalized understanding. No questions voiced at this time.

## 2021-06-30 ENCOUNTER — OFFICE VISIT (OUTPATIENT)
Dept: FAMILY MEDICINE CLINIC | Facility: CLINIC | Age: 41
End: 2021-06-30
Payer: COMMERCIAL

## 2021-06-30 VITALS
RESPIRATION RATE: 21 BRPM | OXYGEN SATURATION: 100 % | HEIGHT: 65.5 IN | SYSTOLIC BLOOD PRESSURE: 100 MMHG | TEMPERATURE: 99 F | WEIGHT: 131 LBS | HEART RATE: 82 BPM | BODY MASS INDEX: 21.56 KG/M2 | DIASTOLIC BLOOD PRESSURE: 62 MMHG

## 2021-06-30 DIAGNOSIS — Z80.3 FAMILY HISTORY OF BREAST CANCER IN FEMALE: ICD-10-CM

## 2021-06-30 DIAGNOSIS — Z83.3 FAMILY HISTORY OF DIABETES MELLITUS: ICD-10-CM

## 2021-06-30 DIAGNOSIS — R73.03 PREDIABETES: ICD-10-CM

## 2021-06-30 DIAGNOSIS — Z00.00 ROUTINE GENERAL MEDICAL EXAMINATION AT HEALTH CARE FACILITY: Primary | ICD-10-CM

## 2021-06-30 DIAGNOSIS — K21.9 GASTROESOPHAGEAL REFLUX DISEASE WITHOUT ESOPHAGITIS: ICD-10-CM

## 2021-06-30 DIAGNOSIS — Z12.31 ENCOUNTER FOR SCREENING MAMMOGRAM FOR MALIGNANT NEOPLASM OF BREAST: ICD-10-CM

## 2021-06-30 PROCEDURE — 3008F BODY MASS INDEX DOCD: CPT | Performed by: FAMILY MEDICINE

## 2021-06-30 PROCEDURE — 99396 PREV VISIT EST AGE 40-64: CPT | Performed by: FAMILY MEDICINE

## 2021-06-30 PROCEDURE — 3078F DIAST BP <80 MM HG: CPT | Performed by: FAMILY MEDICINE

## 2021-06-30 PROCEDURE — 3074F SYST BP LT 130 MM HG: CPT | Performed by: FAMILY MEDICINE

## 2021-06-30 NOTE — PROGRESS NOTES
Cassidy Ocasio is a 37year old female. CC:  Patient presents with:   Well Adult: no pap smear       HPI:  Mammogram Never done  Annual Depression Screen due on 06/23/2021  Annual Physical due on 06/23/2021  Pap Smear,3 Years due on 06/23/2023  Influenza Risk        Allergies:  No Known Allergies   Current Meds:  Omeprazole 40 MG Oral Capsule Delayed Release, Take 1 capsule (40 mg total) by mouth daily. , Disp: 90 capsule, Rfl: 3  SUMAtriptan Succinate 50 MG Oral Tab, 1 tablet by mouth once.   May repeat in 03/08/2021 04/05/2021      FLUZONE 6 months and older PFS 0.5 ml (38264)                          10/28/2016      Flucelvax 0.5 Ml Quad PFS Single Dose                          01/01/2020      Influenza             11/05/2020      RHO(D) Immune Globulin throat are clear  EYES:PERRL, EOMI, conjunctiva are clear  NECK: supple,no adenopathy,no bruits  CHEST: no chest tenderness  BREAST: No masses or lesions, no nipple discharge, normal appearing skin, no tenderness  LUNGS: clear to auscultation  CARDIO: RRR

## 2021-11-21 ENCOUNTER — PATIENT MESSAGE (OUTPATIENT)
Dept: FAMILY MEDICINE CLINIC | Facility: CLINIC | Age: 41
End: 2021-11-21

## 2021-11-22 NOTE — TELEPHONE ENCOUNTER
From: Ynes Garza  To: Nael Talavera MD  Sent: 11/21/2021 9:46 AM CST  Subject: Blood work    Hello Doctor,    If I remember correctly, On my physical visit in June this year, I was told to get my blood work done around October basing it off the timing of

## 2021-11-23 ENCOUNTER — LAB ENCOUNTER (OUTPATIENT)
Dept: LAB | Age: 41
End: 2021-11-23
Attending: FAMILY MEDICINE
Payer: COMMERCIAL

## 2021-11-23 DIAGNOSIS — K21.9 GASTROESOPHAGEAL REFLUX DISEASE WITHOUT ESOPHAGITIS: ICD-10-CM

## 2021-11-23 DIAGNOSIS — Z00.00 ROUTINE GENERAL MEDICAL EXAMINATION AT HEALTH CARE FACILITY: ICD-10-CM

## 2021-11-23 DIAGNOSIS — R73.03 PREDIABETES: ICD-10-CM

## 2021-11-23 DIAGNOSIS — Z83.3 FAMILY HISTORY OF DIABETES MELLITUS: ICD-10-CM

## 2021-11-23 PROCEDURE — 80061 LIPID PANEL: CPT | Performed by: FAMILY MEDICINE

## 2021-11-23 PROCEDURE — 80050 GENERAL HEALTH PANEL: CPT | Performed by: FAMILY MEDICINE

## 2021-11-23 PROCEDURE — 83036 HEMOGLOBIN GLYCOSYLATED A1C: CPT | Performed by: FAMILY MEDICINE

## 2021-11-23 PROCEDURE — 82607 VITAMIN B-12: CPT | Performed by: FAMILY MEDICINE

## 2022-03-11 DIAGNOSIS — K21.9 GASTROESOPHAGEAL REFLUX DISEASE WITHOUT ESOPHAGITIS: ICD-10-CM

## 2022-03-11 RX ORDER — OMEPRAZOLE 40 MG/1
40 CAPSULE, DELAYED RELEASE ORAL DAILY
Qty: 90 CAPSULE | Refills: 0 | Status: SHIPPED | OUTPATIENT
Start: 2022-03-11 | End: 2022-10-04

## 2022-03-11 NOTE — TELEPHONE ENCOUNTER
Requesting refill on omeprazole. LOV 6/30/2021. Advised to return in one year. Per OV note dated 6/30/2021, patient to continue PPI. Please approve or deny pending rx. Thank you.

## 2022-03-17 ENCOUNTER — HOSPITAL ENCOUNTER (OUTPATIENT)
Dept: MAMMOGRAPHY | Age: 42
Discharge: HOME | End: 2022-03-17
Attending: FAMILY MEDICINE
Payer: COMMERCIAL

## 2022-03-17 DIAGNOSIS — Z12.31 ENCOUNTER FOR SCREENING MAMMOGRAM FOR MALIGNANT NEOPLASM OF BREAST: ICD-10-CM

## 2022-03-17 DIAGNOSIS — Z80.3 FAMILY HISTORY OF BREAST CANCER IN FEMALE: ICD-10-CM

## 2022-03-17 PROCEDURE — 77063 BREAST TOMOSYNTHESIS BI: CPT | Performed by: FAMILY MEDICINE

## 2022-03-17 PROCEDURE — 77067 SCR MAMMO BI INCL CAD: CPT | Performed by: FAMILY MEDICINE

## 2022-05-06 ENCOUNTER — HOSPITAL ENCOUNTER (OUTPATIENT)
Dept: ULTRASOUND IMAGING | Age: 42
Discharge: HOME | End: 2022-05-06
Attending: FAMILY MEDICINE
Payer: COMMERCIAL

## 2022-05-06 DIAGNOSIS — R92.8 ABNORMAL MAMMOGRAM: ICD-10-CM

## 2022-05-06 PROCEDURE — 76642 ULTRASOUND BREAST LIMITED: CPT | Performed by: FAMILY MEDICINE

## 2022-09-13 ENCOUNTER — OFFICE VISIT (OUTPATIENT)
Dept: FAMILY MEDICINE CLINIC | Facility: CLINIC | Age: 42
End: 2022-09-13
Payer: COMMERCIAL

## 2022-09-13 VITALS
SYSTOLIC BLOOD PRESSURE: 108 MMHG | RESPIRATION RATE: 16 BRPM | HEART RATE: 84 BPM | OXYGEN SATURATION: 98 % | WEIGHT: 136 LBS | DIASTOLIC BLOOD PRESSURE: 62 MMHG | HEIGHT: 65.5 IN | BODY MASS INDEX: 22.39 KG/M2

## 2022-09-13 DIAGNOSIS — Z83.3 FAMILY HISTORY OF DIABETES MELLITUS: ICD-10-CM

## 2022-09-13 DIAGNOSIS — Z00.00 LABORATORY EXAMINATION ORDERED AS PART OF A ROUTINE GENERAL MEDICAL EXAMINATION: ICD-10-CM

## 2022-09-13 DIAGNOSIS — K21.9 GASTROESOPHAGEAL REFLUX DISEASE WITHOUT ESOPHAGITIS: ICD-10-CM

## 2022-09-13 DIAGNOSIS — Z80.3 FAMILY HISTORY OF BREAST CANCER IN FEMALE: ICD-10-CM

## 2022-09-13 DIAGNOSIS — Z00.00 ROUTINE GENERAL MEDICAL EXAMINATION AT HEALTH CARE FACILITY: Primary | ICD-10-CM

## 2022-09-13 DIAGNOSIS — Z12.31 ENCOUNTER FOR SCREENING MAMMOGRAM FOR BREAST CANCER: ICD-10-CM

## 2022-09-13 PROCEDURE — 99396 PREV VISIT EST AGE 40-64: CPT | Performed by: FAMILY MEDICINE

## 2022-09-13 PROCEDURE — 3074F SYST BP LT 130 MM HG: CPT | Performed by: FAMILY MEDICINE

## 2022-09-13 PROCEDURE — 3008F BODY MASS INDEX DOCD: CPT | Performed by: FAMILY MEDICINE

## 2022-09-13 PROCEDURE — 3078F DIAST BP <80 MM HG: CPT | Performed by: FAMILY MEDICINE

## 2022-09-19 ENCOUNTER — LAB ENCOUNTER (OUTPATIENT)
Dept: LAB | Age: 42
End: 2022-09-19
Attending: FAMILY MEDICINE

## 2022-09-19 DIAGNOSIS — Z00.00 LABORATORY EXAMINATION ORDERED AS PART OF A ROUTINE GENERAL MEDICAL EXAMINATION: ICD-10-CM

## 2022-09-19 DIAGNOSIS — Z83.3 FAMILY HISTORY OF DIABETES MELLITUS: ICD-10-CM

## 2022-09-19 LAB
ALBUMIN SERPL-MCNC: 3.6 G/DL (ref 3.4–5)
ALBUMIN/GLOB SERPL: 1 {RATIO} (ref 1–2)
ALP LIVER SERPL-CCNC: 45 U/L
ALT SERPL-CCNC: 19 U/L
ANION GAP SERPL CALC-SCNC: 7 MMOL/L (ref 0–18)
AST SERPL-CCNC: 16 U/L (ref 15–37)
BASOPHILS # BLD AUTO: 0.06 X10(3) UL (ref 0–0.2)
BASOPHILS NFR BLD AUTO: 1.1 %
BILIRUB SERPL-MCNC: 0.3 MG/DL (ref 0.1–2)
BUN BLD-MCNC: 13 MG/DL (ref 7–18)
CALCIUM BLD-MCNC: 8.8 MG/DL (ref 8.5–10.1)
CHLORIDE SERPL-SCNC: 109 MMOL/L (ref 98–112)
CHOLEST SERPL-MCNC: 132 MG/DL (ref ?–200)
CO2 SERPL-SCNC: 23 MMOL/L (ref 21–32)
CREAT BLD-MCNC: 0.59 MG/DL
EOSINOPHIL # BLD AUTO: 0.13 X10(3) UL (ref 0–0.7)
EOSINOPHIL NFR BLD AUTO: 2.4 %
ERYTHROCYTE [DISTWIDTH] IN BLOOD BY AUTOMATED COUNT: 13.5 %
EST. AVERAGE GLUCOSE BLD GHB EST-MCNC: 117 MG/DL (ref 68–126)
FASTING PATIENT LIPID ANSWER: YES
FASTING STATUS PATIENT QL REPORTED: YES
GFR SERPLBLD BASED ON 1.73 SQ M-ARVRAT: 116 ML/MIN/1.73M2 (ref 60–?)
GLOBULIN PLAS-MCNC: 3.6 G/DL (ref 2.8–4.4)
GLUCOSE BLD-MCNC: 94 MG/DL (ref 70–99)
HBA1C MFR BLD: 5.7 % (ref ?–5.7)
HCT VFR BLD AUTO: 38.1 %
HDLC SERPL-MCNC: 51 MG/DL (ref 40–59)
HGB BLD-MCNC: 12 G/DL
IMM GRANULOCYTES # BLD AUTO: 0.01 X10(3) UL (ref 0–1)
IMM GRANULOCYTES NFR BLD: 0.2 %
LDLC SERPL CALC-MCNC: 70 MG/DL (ref ?–100)
LYMPHOCYTES # BLD AUTO: 1.98 X10(3) UL (ref 1–4)
LYMPHOCYTES NFR BLD AUTO: 35.8 %
MCH RBC QN AUTO: 28.4 PG (ref 26–34)
MCHC RBC AUTO-ENTMCNC: 31.5 G/DL (ref 31–37)
MCV RBC AUTO: 90.3 FL
MONOCYTES # BLD AUTO: 0.37 X10(3) UL (ref 0.1–1)
MONOCYTES NFR BLD AUTO: 6.7 %
NEUTROPHILS # BLD AUTO: 2.98 X10 (3) UL (ref 1.5–7.7)
NEUTROPHILS # BLD AUTO: 2.98 X10(3) UL (ref 1.5–7.7)
NEUTROPHILS NFR BLD AUTO: 53.8 %
NONHDLC SERPL-MCNC: 81 MG/DL (ref ?–130)
OSMOLALITY SERPL CALC.SUM OF ELEC: 288 MOSM/KG (ref 275–295)
PLATELET # BLD AUTO: 292 10(3)UL (ref 150–450)
POTASSIUM SERPL-SCNC: 4 MMOL/L (ref 3.5–5.1)
PROT SERPL-MCNC: 7.2 G/DL (ref 6.4–8.2)
RBC # BLD AUTO: 4.22 X10(6)UL
SODIUM SERPL-SCNC: 139 MMOL/L (ref 136–145)
TRIGL SERPL-MCNC: 45 MG/DL (ref 30–149)
TSI SER-ACNC: 0.5 MIU/ML (ref 0.36–3.74)
VIT D+METAB SERPL-MCNC: 28.4 NG/ML (ref 30–100)
VLDLC SERPL CALC-MCNC: 7 MG/DL (ref 0–30)
WBC # BLD AUTO: 5.5 X10(3) UL (ref 4–11)

## 2022-09-19 PROCEDURE — 83036 HEMOGLOBIN GLYCOSYLATED A1C: CPT | Performed by: FAMILY MEDICINE

## 2022-09-19 PROCEDURE — 82306 VITAMIN D 25 HYDROXY: CPT | Performed by: FAMILY MEDICINE

## 2022-09-19 PROCEDURE — 80050 GENERAL HEALTH PANEL: CPT | Performed by: FAMILY MEDICINE

## 2022-09-19 PROCEDURE — 80061 LIPID PANEL: CPT | Performed by: FAMILY MEDICINE

## 2022-10-03 DIAGNOSIS — K21.9 GASTROESOPHAGEAL REFLUX DISEASE WITHOUT ESOPHAGITIS: ICD-10-CM

## 2022-10-04 RX ORDER — OMEPRAZOLE 40 MG/1
40 CAPSULE, DELAYED RELEASE ORAL DAILY
Qty: 90 CAPSULE | Refills: 3 | Status: SHIPPED | OUTPATIENT
Start: 2022-10-04 | End: 2023-10-30

## 2022-12-06 ENCOUNTER — OFFICE VISIT (OUTPATIENT)
Dept: FAMILY MEDICINE CLINIC | Facility: CLINIC | Age: 42
End: 2022-12-06
Payer: COMMERCIAL

## 2022-12-06 VITALS
RESPIRATION RATE: 16 BRPM | WEIGHT: 137 LBS | TEMPERATURE: 98 F | HEART RATE: 106 BPM | DIASTOLIC BLOOD PRESSURE: 66 MMHG | SYSTOLIC BLOOD PRESSURE: 108 MMHG | BODY MASS INDEX: 22.02 KG/M2 | OXYGEN SATURATION: 97 % | HEIGHT: 66 IN

## 2022-12-06 DIAGNOSIS — J11.1 INFLUENZA-LIKE ILLNESS: Primary | ICD-10-CM

## 2022-12-06 DIAGNOSIS — R68.89 INFLUENZA-LIKE SYMPTOMS: ICD-10-CM

## 2022-12-06 PROCEDURE — 3074F SYST BP LT 130 MM HG: CPT | Performed by: NURSE PRACTITIONER

## 2022-12-06 PROCEDURE — 3078F DIAST BP <80 MM HG: CPT | Performed by: NURSE PRACTITIONER

## 2022-12-06 PROCEDURE — 99213 OFFICE O/P EST LOW 20 MIN: CPT | Performed by: NURSE PRACTITIONER

## 2022-12-06 PROCEDURE — 87637 SARSCOV2&INF A&B&RSV AMP PRB: CPT | Performed by: NURSE PRACTITIONER

## 2022-12-06 PROCEDURE — 3008F BODY MASS INDEX DOCD: CPT | Performed by: NURSE PRACTITIONER

## 2022-12-06 RX ORDER — BENZONATATE 200 MG/1
200 CAPSULE ORAL 3 TIMES DAILY PRN
Qty: 20 CAPSULE | Refills: 0 | Status: SHIPPED | OUTPATIENT
Start: 2022-12-06 | End: 2022-12-13

## 2022-12-06 RX ORDER — OSELTAMIVIR PHOSPHATE 75 MG/1
75 CAPSULE ORAL 2 TIMES DAILY
Qty: 10 CAPSULE | Refills: 0 | Status: SHIPPED | OUTPATIENT
Start: 2022-12-06 | End: 2022-12-11

## 2022-12-07 LAB
FLUAV + FLUBV RNA SPEC NAA+PROBE: NOT DETECTED
FLUAV + FLUBV RNA SPEC NAA+PROBE: NOT DETECTED
RSV RNA SPEC NAA+PROBE: NOT DETECTED
SARS-COV-2 RNA RESP QL NAA+PROBE: DETECTED

## 2023-01-04 ENCOUNTER — IMMUNIZATION (OUTPATIENT)
Dept: FAMILY MEDICINE CLINIC | Facility: CLINIC | Age: 43
End: 2023-01-04
Payer: COMMERCIAL

## 2023-01-04 DIAGNOSIS — Z23 NEED FOR VACCINATION: Primary | ICD-10-CM

## 2023-01-04 PROCEDURE — 90686 IIV4 VACC NO PRSV 0.5 ML IM: CPT | Performed by: FAMILY MEDICINE

## 2023-01-04 PROCEDURE — 90471 IMMUNIZATION ADMIN: CPT | Performed by: FAMILY MEDICINE

## 2023-09-15 ENCOUNTER — TELEPHONE (OUTPATIENT)
Dept: FAMILY MEDICINE CLINIC | Facility: CLINIC | Age: 43
End: 2023-09-15

## 2023-09-15 DIAGNOSIS — R73.03 PREDIABETES: ICD-10-CM

## 2023-09-15 DIAGNOSIS — Z00.00 LABORATORY EXAMINATION ORDERED AS PART OF A ROUTINE GENERAL MEDICAL EXAMINATION: Primary | ICD-10-CM

## 2023-10-10 ENCOUNTER — LAB ENCOUNTER (OUTPATIENT)
Dept: LAB | Age: 43
End: 2023-10-10
Attending: FAMILY MEDICINE
Payer: COMMERCIAL

## 2023-10-10 DIAGNOSIS — Z00.00 LABORATORY EXAMINATION ORDERED AS PART OF A ROUTINE GENERAL MEDICAL EXAMINATION: ICD-10-CM

## 2023-10-10 DIAGNOSIS — R73.03 PREDIABETES: ICD-10-CM

## 2023-10-10 LAB
ALBUMIN SERPL-MCNC: 3.4 G/DL (ref 3.4–5)
ALBUMIN/GLOB SERPL: 0.9 {RATIO} (ref 1–2)
ALP LIVER SERPL-CCNC: 45 U/L
ALT SERPL-CCNC: 21 U/L
ANION GAP SERPL CALC-SCNC: 6 MMOL/L (ref 0–18)
AST SERPL-CCNC: 13 U/L (ref 15–37)
BASOPHILS # BLD AUTO: 0.06 X10(3) UL (ref 0–0.2)
BASOPHILS NFR BLD AUTO: 0.9 %
BILIRUB SERPL-MCNC: 0.3 MG/DL (ref 0.1–2)
BUN BLD-MCNC: 15 MG/DL (ref 7–18)
CALCIUM BLD-MCNC: 8.8 MG/DL (ref 8.5–10.1)
CHLORIDE SERPL-SCNC: 113 MMOL/L (ref 98–112)
CHOLEST SERPL-MCNC: 130 MG/DL (ref ?–200)
CO2 SERPL-SCNC: 21 MMOL/L (ref 21–32)
CREAT BLD-MCNC: 0.76 MG/DL
EGFRCR SERPLBLD CKD-EPI 2021: 100 ML/MIN/1.73M2 (ref 60–?)
EOSINOPHIL # BLD AUTO: 0.14 X10(3) UL (ref 0–0.7)
EOSINOPHIL NFR BLD AUTO: 2 %
ERYTHROCYTE [DISTWIDTH] IN BLOOD BY AUTOMATED COUNT: 13.3 %
FASTING PATIENT LIPID ANSWER: YES
FASTING STATUS PATIENT QL REPORTED: YES
GLOBULIN PLAS-MCNC: 4 G/DL (ref 2.8–4.4)
GLUCOSE BLD-MCNC: 96 MG/DL (ref 70–99)
HCT VFR BLD AUTO: 38.4 %
HDLC SERPL-MCNC: 54 MG/DL (ref 40–59)
HGB BLD-MCNC: 13 G/DL
IMM GRANULOCYTES # BLD AUTO: 0.02 X10(3) UL (ref 0–1)
IMM GRANULOCYTES NFR BLD: 0.3 %
LDLC SERPL CALC-MCNC: 67 MG/DL (ref ?–100)
LYMPHOCYTES # BLD AUTO: 2.29 X10(3) UL (ref 1–4)
LYMPHOCYTES NFR BLD AUTO: 32.8 %
MCH RBC QN AUTO: 29.2 PG (ref 26–34)
MCHC RBC AUTO-ENTMCNC: 33.9 G/DL (ref 31–37)
MCV RBC AUTO: 86.3 FL
MONOCYTES # BLD AUTO: 0.42 X10(3) UL (ref 0.1–1)
MONOCYTES NFR BLD AUTO: 6 %
NEUTROPHILS # BLD AUTO: 4.06 X10 (3) UL (ref 1.5–7.7)
NEUTROPHILS # BLD AUTO: 4.06 X10(3) UL (ref 1.5–7.7)
NEUTROPHILS NFR BLD AUTO: 58 %
NONHDLC SERPL-MCNC: 76 MG/DL (ref ?–130)
OSMOLALITY SERPL CALC.SUM OF ELEC: 291 MOSM/KG (ref 275–295)
PLATELET # BLD AUTO: 287 10(3)UL (ref 150–450)
POTASSIUM SERPL-SCNC: 4.2 MMOL/L (ref 3.5–5.1)
PROT SERPL-MCNC: 7.4 G/DL (ref 6.4–8.2)
RBC # BLD AUTO: 4.45 X10(6)UL
SODIUM SERPL-SCNC: 140 MMOL/L (ref 136–145)
TRIGL SERPL-MCNC: 35 MG/DL (ref 30–149)
TSI SER-ACNC: 0.59 MIU/ML (ref 0.36–3.74)
VLDLC SERPL CALC-MCNC: 5 MG/DL (ref 0–30)
WBC # BLD AUTO: 7 X10(3) UL (ref 4–11)

## 2023-10-10 PROCEDURE — 84443 ASSAY THYROID STIM HORMONE: CPT

## 2023-10-10 PROCEDURE — 36415 COLL VENOUS BLD VENIPUNCTURE: CPT

## 2023-10-10 PROCEDURE — 83036 HEMOGLOBIN GLYCOSYLATED A1C: CPT

## 2023-10-10 PROCEDURE — 85025 COMPLETE CBC W/AUTO DIFF WBC: CPT

## 2023-10-10 PROCEDURE — 80061 LIPID PANEL: CPT

## 2023-10-10 PROCEDURE — 80053 COMPREHEN METABOLIC PANEL: CPT

## 2023-10-11 ENCOUNTER — OFFICE VISIT (OUTPATIENT)
Dept: FAMILY MEDICINE CLINIC | Facility: CLINIC | Age: 43
End: 2023-10-11
Payer: COMMERCIAL

## 2023-10-11 VITALS
DIASTOLIC BLOOD PRESSURE: 60 MMHG | HEART RATE: 100 BPM | HEIGHT: 66 IN | BODY MASS INDEX: 21.53 KG/M2 | OXYGEN SATURATION: 99 % | TEMPERATURE: 99 F | RESPIRATION RATE: 21 BRPM | SYSTOLIC BLOOD PRESSURE: 122 MMHG | WEIGHT: 134 LBS

## 2023-10-11 DIAGNOSIS — G43.829 MENSTRUAL MIGRAINE WITHOUT STATUS MIGRAINOSUS, NOT INTRACTABLE: ICD-10-CM

## 2023-10-11 DIAGNOSIS — R73.03 PREDIABETES: ICD-10-CM

## 2023-10-11 DIAGNOSIS — Z01.419 WELL WOMAN EXAM WITH ROUTINE GYNECOLOGICAL EXAM: Primary | ICD-10-CM

## 2023-10-11 DIAGNOSIS — Z12.4 SCREENING FOR CERVICAL CANCER: ICD-10-CM

## 2023-10-11 DIAGNOSIS — Z12.31 ENCOUNTER FOR SCREENING MAMMOGRAM FOR MALIGNANT NEOPLASM OF BREAST: ICD-10-CM

## 2023-10-11 DIAGNOSIS — K21.9 GASTROESOPHAGEAL REFLUX DISEASE WITHOUT ESOPHAGITIS: ICD-10-CM

## 2023-10-11 DIAGNOSIS — Z23 NEED FOR VACCINATION: ICD-10-CM

## 2023-10-11 LAB
EST. AVERAGE GLUCOSE BLD GHB EST-MCNC: 114 MG/DL (ref 68–126)
HBA1C MFR BLD: 5.6 % (ref ?–5.7)

## 2023-10-11 PROCEDURE — 87624 HPV HI-RISK TYP POOLED RSLT: CPT | Performed by: FAMILY MEDICINE

## 2023-10-11 PROCEDURE — 3074F SYST BP LT 130 MM HG: CPT | Performed by: FAMILY MEDICINE

## 2023-10-11 PROCEDURE — 99396 PREV VISIT EST AGE 40-64: CPT | Performed by: FAMILY MEDICINE

## 2023-10-11 PROCEDURE — 3008F BODY MASS INDEX DOCD: CPT | Performed by: FAMILY MEDICINE

## 2023-10-11 PROCEDURE — 88175 CYTOPATH C/V AUTO FLUID REDO: CPT | Performed by: FAMILY MEDICINE

## 2023-10-11 PROCEDURE — 3078F DIAST BP <80 MM HG: CPT | Performed by: FAMILY MEDICINE

## 2023-10-11 PROCEDURE — 90686 IIV4 VACC NO PRSV 0.5 ML IM: CPT | Performed by: FAMILY MEDICINE

## 2023-10-11 PROCEDURE — 90471 IMMUNIZATION ADMIN: CPT | Performed by: FAMILY MEDICINE

## 2023-10-11 RX ORDER — PAROXETINE 10 MG/1
10 TABLET, FILM COATED ORAL EVERY MORNING
Qty: 30 TABLET | Refills: 2 | Status: SHIPPED | OUTPATIENT
Start: 2023-10-11

## 2023-10-11 NOTE — PATIENT INSTRUCTIONS
For anxiety/mood issues, try   Over the counter magnesium at night   Increase exercises slowly   Consider counseling or therapy (even a )   If not improving - try paroxetine     Health Maintenance  Health and Safety   Eat healthy well balanced diet - majority should be lean protein and vegetables  Get at least 150 min of exercise per week (30 min/5 days)  Wear sunscreen - SPF 30 or higher and reapply every 80min. Wear seat belts and drive safely. Schedule regular appointments with dentist.  Schedule yearly eye exam if you wear glasses/contacts. Vaccinations   Yearly Flu Vaccine recommended for everyone over the age of 7 months   Tetanus, Diptheria and Pertussis vaccine should be given to adults every 7-10 years. Adults 50+ are recommended to get shingles vaccine, Shingrix (2 doses  by 2-6 months). Covid-19 vaccine is recommended. It is safe and effective at decreasing the risk of disease and complications (including need for mechanical ventilation and death)  Pneumonia vaccine (Prevnar 20) is recommended for all adults 65+ and adults under age 72 with chronic medical conditions like lung disease/asthma, diabetes, heart disease.     Colon Cancer screening  The American Cancer Society recommends screening adults 45 and over

## 2023-10-12 LAB — HPV I/H RISK 1 DNA SPEC QL NAA+PROBE: NEGATIVE

## 2023-10-17 LAB
.: NORMAL
.: NORMAL

## 2023-10-30 DIAGNOSIS — K21.9 GASTROESOPHAGEAL REFLUX DISEASE WITHOUT ESOPHAGITIS: ICD-10-CM

## 2023-10-30 NOTE — TELEPHONE ENCOUNTER
Medication(s) to Refill:   Requested Prescriptions     Pending Prescriptions Disp Refills    Omeprazole 40 MG Oral Capsule Delayed Release 90 capsule 3     Sig: Take 1 capsule (40 mg total) by mouth daily. Reason for Medication Refill being sent to Provider / Reason Protocol Failed:  [] 90 day refill has already been granted  [] Blood Pressure out of range  [] Labs Abnormal/over due  [] Medication not previously prescribed by Provider  [] Non-Protocol Medication  [] Controlled Substance   [] Due for appointment- no future appointment scheduled  [] No Follow up specified      Last Time Medication was Filled:  10/4/22      Last Office Visit with PCP: 10/11/23    When Patient was Due Back to the Office:  3 months  (from when PCP last addressed condition)    Future Appointments:  No future appointments.       Last Blood Pressures:  BP Readings from Last 2 Encounters:  10/11/23 : 122/60  12/06/22 : 108/66        Action taken:  [] Refill approved per protocol  [] Routing to provider for approval

## 2023-10-31 RX ORDER — OMEPRAZOLE 40 MG/1
40 CAPSULE, DELAYED RELEASE ORAL DAILY
Qty: 90 CAPSULE | Refills: 3 | Status: SHIPPED | OUTPATIENT
Start: 2023-10-31

## 2023-11-09 NOTE — PATIENT INSTRUCTIONS
Health Maintenance    Return to office (or other lab) for fasting blood work    Health and [address] [place] [address] healthy well balanced diet - majority should be protein and vegetables  Get at least 150 min of exercise per week (30 min/5 days)  Wear sunscreen - SPF 30 or higher and reapply every 2 hours. Wear seat belts and drive safely. Schedule regular appointments with dentist.  Schedule yearly eye exam if you wear glasses/contacts. Vaccinations   Yearly Flu Vaccine recommended for everyone over the age of 7 months   Tetanus, Diptheria and Pertussis vaccine should be given to adults every 7-10 years. Adults 50+ are recommended to get shingles vaccine, Shingrix (2 doses  by 2-6 months). Covid-19 vaccine is recommended. It is safe and effective at decreasing the risk of disease and complications (including need for mechanical ventilation and death)  Pneumonia vaccines (Pneumovax 23 and Prevnar 13) are recommended for all adults 65+    Colon Cancer screening  The American Cancer Society recommends screening adults 45 and over     C/ Pepe Lovett 77 IUD IF YOUR PMS SYMPTOMS WORSEN Melia Gutierrez [employer]  Dr. Ijeoma Nunez    [phone] [address] [facility] Yasmin Gambinohing Genin [address], [address] [place] [address]    [address] [facility] Vamsi Duarte, [address] [facility].  [address] [facility], [facility] W Moiz Yuen Attending Attestation (For Attendings USE Only)...

## 2024-11-19 ENCOUNTER — TELEPHONE (OUTPATIENT)
Dept: FAMILY MEDICINE CLINIC | Facility: CLINIC | Age: 44
End: 2024-11-19

## 2024-11-19 DIAGNOSIS — Z00.00 ROUTINE GENERAL MEDICAL EXAMINATION AT A HEALTH CARE FACILITY: Primary | ICD-10-CM

## 2024-11-26 ENCOUNTER — OFFICE VISIT (OUTPATIENT)
Dept: FAMILY MEDICINE CLINIC | Facility: CLINIC | Age: 44
End: 2024-11-26
Payer: COMMERCIAL

## 2024-11-26 ENCOUNTER — LAB ENCOUNTER (OUTPATIENT)
Dept: LAB | Age: 44
End: 2024-11-26
Attending: FAMILY MEDICINE
Payer: COMMERCIAL

## 2024-11-26 VITALS
HEIGHT: 66 IN | WEIGHT: 136 LBS | BODY MASS INDEX: 21.86 KG/M2 | SYSTOLIC BLOOD PRESSURE: 124 MMHG | DIASTOLIC BLOOD PRESSURE: 80 MMHG | HEART RATE: 88 BPM | OXYGEN SATURATION: 99 %

## 2024-11-26 DIAGNOSIS — Z00.00 ROUTINE PHYSICAL EXAMINATION: ICD-10-CM

## 2024-11-26 DIAGNOSIS — Z00.00 ROUTINE PHYSICAL EXAMINATION: Primary | ICD-10-CM

## 2024-11-26 DIAGNOSIS — Z00.00 ROUTINE GENERAL MEDICAL EXAMINATION AT A HEALTH CARE FACILITY: ICD-10-CM

## 2024-11-26 DIAGNOSIS — R73.03 PREDIABETES: ICD-10-CM

## 2024-11-26 DIAGNOSIS — N95.1 PERIMENOPAUSE: ICD-10-CM

## 2024-11-26 DIAGNOSIS — Z12.31 ENCOUNTER FOR SCREENING MAMMOGRAM FOR BREAST CANCER: ICD-10-CM

## 2024-11-26 DIAGNOSIS — F06.4 ANXIETY DISORDER DUE TO GENERAL MEDICAL CONDITION: ICD-10-CM

## 2024-11-26 DIAGNOSIS — K21.9 GASTROESOPHAGEAL REFLUX DISEASE WITHOUT ESOPHAGITIS: ICD-10-CM

## 2024-11-26 LAB
ALBUMIN SERPL-MCNC: 4.5 G/DL (ref 3.2–4.8)
ALBUMIN/GLOB SERPL: 1.6 {RATIO} (ref 1–2)
ALP LIVER SERPL-CCNC: 51 U/L
ALT SERPL-CCNC: 20 U/L
ANION GAP SERPL CALC-SCNC: 5 MMOL/L (ref 0–18)
AST SERPL-CCNC: 26 U/L (ref ?–34)
BASOPHILS # BLD AUTO: 0.07 X10(3) UL (ref 0–0.2)
BASOPHILS NFR BLD AUTO: 1.1 %
BILIRUB SERPL-MCNC: 0.4 MG/DL (ref 0.3–1.2)
BUN BLD-MCNC: 10 MG/DL (ref 9–23)
CALCIUM BLD-MCNC: 9.4 MG/DL (ref 8.7–10.4)
CHLORIDE SERPL-SCNC: 107 MMOL/L (ref 98–112)
CHOLEST SERPL-MCNC: 145 MG/DL (ref ?–200)
CO2 SERPL-SCNC: 26 MMOL/L (ref 21–32)
CREAT BLD-MCNC: 0.72 MG/DL
EGFRCR SERPLBLD CKD-EPI 2021: 106 ML/MIN/1.73M2 (ref 60–?)
EOSINOPHIL # BLD AUTO: 0.15 X10(3) UL (ref 0–0.7)
EOSINOPHIL NFR BLD AUTO: 2.4 %
ERYTHROCYTE [DISTWIDTH] IN BLOOD BY AUTOMATED COUNT: 14.4 %
EST. AVERAGE GLUCOSE BLD GHB EST-MCNC: 111 MG/DL (ref 68–126)
FASTING PATIENT LIPID ANSWER: YES
FASTING STATUS PATIENT QL REPORTED: YES
GLOBULIN PLAS-MCNC: 2.8 G/DL (ref 2–3.5)
GLUCOSE BLD-MCNC: 92 MG/DL (ref 70–99)
HBA1C MFR BLD: 5.5 % (ref ?–5.7)
HCT VFR BLD AUTO: 38.4 %
HDLC SERPL-MCNC: 57 MG/DL (ref 40–59)
HGB BLD-MCNC: 12.6 G/DL
IMM GRANULOCYTES # BLD AUTO: 0.01 X10(3) UL (ref 0–1)
IMM GRANULOCYTES NFR BLD: 0.2 %
LDLC SERPL CALC-MCNC: 76 MG/DL (ref ?–100)
LYMPHOCYTES # BLD AUTO: 2.3 X10(3) UL (ref 1–4)
LYMPHOCYTES NFR BLD AUTO: 36.2 %
MCH RBC QN AUTO: 28 PG (ref 26–34)
MCHC RBC AUTO-ENTMCNC: 32.8 G/DL (ref 31–37)
MCV RBC AUTO: 85.3 FL
MONOCYTES # BLD AUTO: 0.43 X10(3) UL (ref 0.1–1)
MONOCYTES NFR BLD AUTO: 6.8 %
NEUTROPHILS # BLD AUTO: 3.4 X10 (3) UL (ref 1.5–7.7)
NEUTROPHILS # BLD AUTO: 3.4 X10(3) UL (ref 1.5–7.7)
NEUTROPHILS NFR BLD AUTO: 53.3 %
NONHDLC SERPL-MCNC: 88 MG/DL (ref ?–130)
OSMOLALITY SERPL CALC.SUM OF ELEC: 285 MOSM/KG (ref 275–295)
PLATELET # BLD AUTO: 299 10(3)UL (ref 150–450)
POTASSIUM SERPL-SCNC: 4.2 MMOL/L (ref 3.5–5.1)
PROT SERPL-MCNC: 7.3 G/DL (ref 5.7–8.2)
RBC # BLD AUTO: 4.5 X10(6)UL
SODIUM SERPL-SCNC: 138 MMOL/L (ref 136–145)
TRIGL SERPL-MCNC: 54 MG/DL (ref 30–149)
TSI SER-ACNC: 0.77 UIU/ML (ref 0.55–4.78)
VLDLC SERPL CALC-MCNC: 8 MG/DL (ref 0–30)
WBC # BLD AUTO: 6.4 X10(3) UL (ref 4–11)

## 2024-11-26 PROCEDURE — 83036 HEMOGLOBIN GLYCOSYLATED A1C: CPT

## 2024-11-26 PROCEDURE — 99396 PREV VISIT EST AGE 40-64: CPT | Performed by: FAMILY MEDICINE

## 2024-11-26 PROCEDURE — 80061 LIPID PANEL: CPT

## 2024-11-26 PROCEDURE — 36415 COLL VENOUS BLD VENIPUNCTURE: CPT

## 2024-11-26 PROCEDURE — 80053 COMPREHEN METABOLIC PANEL: CPT

## 2024-11-26 PROCEDURE — 84443 ASSAY THYROID STIM HORMONE: CPT

## 2024-11-26 PROCEDURE — 85025 COMPLETE CBC W/AUTO DIFF WBC: CPT

## 2024-11-26 RX ORDER — ALPRAZOLAM 0.25 MG/1
0.25 TABLET ORAL 2 TIMES DAILY PRN
Qty: 10 TABLET | Refills: 3 | Status: SHIPPED | OUTPATIENT
Start: 2024-11-26

## 2024-11-26 RX ORDER — SUMATRIPTAN 50 MG/1
50 TABLET, FILM COATED ORAL
Qty: 9 TABLET | Refills: 1 | Status: SHIPPED | OUTPATIENT
Start: 2024-11-26

## 2024-11-26 RX ORDER — OMEPRAZOLE 40 MG/1
40 CAPSULE, DELAYED RELEASE ORAL DAILY
Qty: 90 CAPSULE | Refills: 3 | Status: SHIPPED | OUTPATIENT
Start: 2024-11-26

## 2024-11-26 NOTE — PROGRESS NOTES
Alyssa Bush is a 44 year old female.    CC:    Chief Complaint   Patient presents with    Physical     Physical and blood work        HPI:  Wellness     Exercise: Yes - but not enough   Drinks water: Yes  Eat variety of fruits & vegetables, lean meats: Yes  Issues with sleep: Yes  Regular dental exams: Yes  Change in size/consistency of stool: No  Change in appearance of mole: No    Gyne Hx  OB History    Para Term  AB Living   3 3 3 0 0 3   SAB IAB Ectopic Multiple Live Births   0 0 0 0 3   Obstetric Comments    done by [facility] - vacuum assisted secondary to deep variables - baby had tight nuchal cord     No LMP recorded.  Period: regular   Sexual activity:monogamy   Previous pap: normal     CAGE Alcohol Screening       2024    10:09 AM   CAGE Flowsheet   Have you ever felt you should Cut down on your drinking? 0   Have people Annoyed you by criticizing your drinking? 0   Have you ever felt bad or Guilty about your drinking? 0   Have you ever had a drink first thing in the morning to steady your nerves or to get rid of a hangover (Eye opener)? 0   Total Score: Item responses on the CAGE are scored 0 or 1, with a higher score an indication of alcohol 0   Total Score: Item responses on the CAGE are scored 0 or 1, with a higher score an indication of alcohol No Risk        Depression Screening (PHQ-2/PHQ-9): Over the LAST 2 WEEKS   Little interest or pleasure in doing things: Not at all    Feeling down, depressed, or hopeless: Not at all    PHQ-2 SCORE: 0          Stone Suicide Severity Rating Scale Screener   In what setting is the screener performed?: an office visit  1. Have you wished you were dead or wished you could go to sleep and not wake up? (past 30 days): No  2. Have you actually had any thoughts of killing yourself? (past 30 days): No  6. Have you ever done anything, started to do anything, or prepared to do anything to end your life? (lifetime): No  Score and Suggested Actions  - Office Visit: No Risk  Score and Intervention  Score and Suggested Actions - Office Visit: No Risk    Also wants to discuss:     Perimenopause   Tried paroxetine   About 1 week before, increased anxiety, difficulty sleeping  Menses are heavier       Follow up     Mood   Better than last year   Working again - feels it is manageable   Occasional stress related migraines       Allergies:  Allergies[1]   Current Meds:  Medications Ordered Prior to Encounter[2]     History:  Past Medical History:    Acute gastritis without mention of hemorrhage    Allergic rhinitis due to other allergen    Anemia    during pregnancy/iron transfusions    GERD (gastroesophageal reflux disease)    Hypothyroidism    Rash and other nonspecific skin eruption      Past Surgical History:   Procedure Laterality Date            ,      ×2    Other surgical history  3/2012    EGD      Family History   Problem Relation Age of Onset    Diabetes Mother     Cancer Mother 50        breast cancer    Breast Cancer Mother 50        est    Cancer Maternal Grandmother         breast cancer   50- 60 years old    Breast Cancer Maternal Grandmother 60        est    High Cholesterol Father     Heart Attack Father     Heart Attack Maternal Grandfather     Thyroid Disorder Sister     Lipids Sister     Diabetes Sister       Family Status   Relation Status    Mo Alive    MGMA     Fa Alive    Jaylen Alive    MGFA     PGMA     PGFA     Sis Alive    Bro Alive    Jaylen Alive    Bro Alive    Son Alive      Social History     Socioeconomic History    Marital status:    Tobacco Use    Smoking status: Never    Smokeless tobacco: Never    Tobacco comments:     Non-smoker   Vaping Use    Vaping status: Never Used   Substance and Sexual Activity    Alcohol use: No    Drug use: No            Immunization History   Administered Date(s) Administered    Covid-19 Vaccine Moderna 100 mcg/0.5 ml 2021, 2021     Covid-19 Vaccine Moderna 50 Mcg/0.25 Ml 11/27/2021    FLULAVAL 6 months & older 0.5 ml Prefilled syringe (21801) 01/04/2023    FLUZONE 6 months and older PFS 0.5 ml (51813) 10/28/2016, 11/13/2021, 10/11/2023    Flucelvax 0.5 Ml Quad PFS Single Dose 01/01/2020    Flucelvax Influenza vaccine, trivalent (ccIIV3), 0.5mL IM 10/13/2024    Influenza 11/05/2020    RHO(D) Immune Globulin 03/17/2017, 06/11/2017    TDAP 04/18/2017    TYPHOID 12/19/2019       Wt Readings from Last 6 Encounters:   11/26/24 136 lb (61.7 kg)   10/11/23 134 lb (60.8 kg)   12/06/22 137 lb (62.1 kg)   09/13/22 136 lb (61.7 kg)   06/30/21 131 lb (59.4 kg)   11/05/20 135 lb (61.2 kg)       BP Readings from Last 3 Encounters:   11/26/24 124/80   10/11/23 122/60   12/06/22 108/66       REVIEW OF SYSTEMS:   Review of Systems   Constitutional:  Negative for chills, fever and malaise/fatigue.   HENT:  Negative for congestion, ear pain and sore throat.    Eyes:  Negative for blurred vision, double vision and pain.   Respiratory:  Negative for cough, shortness of breath and wheezing.    Cardiovascular:  Negative for chest pain, palpitations and leg swelling.   Gastrointestinal:  Negative for abdominal pain, blood in stool, constipation, diarrhea, melena, nausea and vomiting.   Genitourinary:  Negative for dysuria, flank pain and frequency.   Musculoskeletal:  Negative for back pain, joint pain and myalgias.   Skin:  Negative for itching and rash.   Neurological:  Negative for dizziness, weakness and headaches.   Endo/Heme/Allergies:  Negative for environmental allergies and polydipsia. Does not bruise/bleed easily.   Psychiatric/Behavioral:  Negative for depression. The patient is not nervous/anxious and does not have insomnia.        EXAM:   /80   Pulse 88   Ht 5' 6\" (1.676 m)   Wt 136 lb (61.7 kg)   SpO2 99%   BMI 21.95 kg/m²   Body mass index is 21.95 kg/m².  No LMP recorded.    Physical Exam  Constitutional:       General: She is not in  acute distress.     Appearance: Normal appearance.   HENT:      Right Ear: Tympanic membrane normal.      Left Ear: Tympanic membrane normal.      Mouth/Throat:      Mouth: Mucous membranes are moist.      Pharynx: Oropharynx is clear. No posterior oropharyngeal erythema.   Eyes:      Extraocular Movements: Extraocular movements intact.      Conjunctiva/sclera: Conjunctivae normal.      Pupils: Pupils are equal, round, and reactive to light.   Cardiovascular:      Rate and Rhythm: Normal rate and regular rhythm.      Pulses: Normal pulses.      Heart sounds: Normal heart sounds.   Pulmonary:      Effort: Pulmonary effort is normal.      Breath sounds: Normal breath sounds. No wheezing or rhonchi.   Abdominal:      General: Abdomen is flat. Bowel sounds are normal.      Palpations: Abdomen is soft.      Tenderness: There is no abdominal tenderness. There is no guarding.   Musculoskeletal:         General: No swelling, tenderness, deformity or signs of injury.      Cervical back: Neck supple.   Lymphadenopathy:      Cervical: No cervical adenopathy.   Skin:     General: Skin is warm and dry.      Findings: No rash.   Neurological:      General: No focal deficit present.      Mental Status: She is alert and oriented to person, place, and time.      Motor: No weakness.   Psychiatric:         Mood and Affect: Mood normal.         Behavior: Behavior normal.         Thought Content: Thought content normal.                ASSESSMENT/PLAN:      1. Routine physical examination  - Hemoglobin A1C; Future    2. Encounter for screening mammogram for breast cancer  - [facility] CHIP 2D+3D SCREENING BILAT (CPT=77067/90907); Future    3. Perimenopause  - ALPRAZolam 0.25 MG Oral Tab; Take 1 tablet (0.25 mg total) by mouth 2 (two) times daily as needed for Anxiety.  Dispense: 10 tablet; Refill: 3  Sumatriptan prn menstrual migraines     4. Prediabetes  - Hemoglobin A1C; Future    5. Anxiety disorder due to general medical condition  -  ALPRAZolam 0.25 MG Oral Tab; Take 1 tablet (0.25 mg total) by mouth 2 (two) times daily as needed for Anxiety.  Dispense: 10 tablet; Refill: 3         Health Maintenance   Topic Date Due    Mammogram  03/17/2023    Annual Physical  10/11/2024    COVID-19 Vaccine (4 - 2024-25 season) 12/26/2024 (Originally 9/1/2024)    Pap Smear  10/11/2026    DTaP,Tdap,and Td Vaccines (2 - Td or Tdap) 04/18/2027    Influenza Vaccine  Completed    Annual Depression Screening  Completed    Pneumococcal Vaccine: Birth to 64yrs  Aged Out         Healthy diet and regular exercise discussed     Meds & Refills for this Visit:  Requested Prescriptions     Signed Prescriptions Disp Refills    ALPRAZolam 0.25 MG Oral Tab 10 tablet 3     Sig: Take 1 tablet (0.25 mg total) by mouth 2 (two) times daily as needed for Anxiety.    SUMAtriptan 50 MG Oral Tab 9 tablet 1     Sig: Take 1 tablet (50 mg total) by mouth daily as needed for Migraine. May repeat in 2 hr if needed. Max dose 100mg/24 hr         Imaging & Consults:  [facility] CHIP 2D+3D SCREENING BILAT (CPT=77067/55258)    Return in about 6 months (around 5/26/2025), or if symptoms worsen or fail to improve.             [1] No Known Allergies  [2]   Current Outpatient Medications on File Prior to Visit   Medication Sig Dispense Refill    PARoxetine 10 MG Oral Tab Take 1 tablet (10 mg total) by mouth every morning. (Patient not taking: Reported on 11/26/2024) 30 tablet 2    triamcinolone 0.1 % External Ointment Apply thin layer to affected area 2 times daily. (Patient not taking: Reported on 11/26/2024) 30 g 1     No current facility-administered medications on file prior to visit.

## 2024-11-26 NOTE — TELEPHONE ENCOUNTER
Alyssa Bush requesting Medication Refill for:    Medication name and dose (copy and paste from medication list): Omeprazole 40 MG Oral Capsule Delayed Release     If medication is not on medication list - transfer patient to RN queue for triage    Preferred Pharmacy:   [facility] [place] [address]       LOV: 11/26/2024   Last Refill date: 10/31/23  Next Scheduled appointment:

## 2025-05-14 ENCOUNTER — OFFICE VISIT (OUTPATIENT)
Dept: FAMILY MEDICINE CLINIC | Facility: CLINIC | Age: 45
End: 2025-05-14
Payer: COMMERCIAL

## 2025-05-14 VITALS
SYSTOLIC BLOOD PRESSURE: 92 MMHG | BODY MASS INDEX: 22.02 KG/M2 | RESPIRATION RATE: 21 BRPM | HEIGHT: 66 IN | WEIGHT: 137 LBS | HEART RATE: 95 BPM | DIASTOLIC BLOOD PRESSURE: 60 MMHG | OXYGEN SATURATION: 99 %

## 2025-05-14 DIAGNOSIS — M25.561 ACUTE PAIN OF RIGHT KNEE: Primary | ICD-10-CM

## 2025-05-14 PROCEDURE — 99213 OFFICE O/P EST LOW 20 MIN: CPT

## 2025-05-14 NOTE — PROGRESS NOTES
[facility] Family Medicine Office Note  Chief Complaint:   Chief Complaint   Patient presents with    Knee Pain     X3 days, right knee pain and swelling, no known injury states the pain has gotten better today       HPI:   Alyssa Bush is a 44 year old female presenting with right knee pain.  Patient reports it occurring for 3 days. States pain has gotten better today since onset.  States she was walking around [place] casually with family on Saturday and the right knee began hurting.  She states by Monday of this week it started to get better.  However she has noticed that the front of her knee as well as the back her knee has been swelling.  He also states that she feels like there is a pulling sensation in her calf and feels like a strain.  Denies any skin color changes or temperature changes of the right calf.  Denies any history of a blood clot or family history of blood clot.  Denies any sedentary activities.    Mechanism: No mechanism of injury that she can recall  Does patient feel or hear a pop?: no  Is there Joint Swelling?  Yes from the right knee and behind the right knee  Where is the pain located?:  Mainly in the front of the knee by the kneecap  Any Pain with movement?:  Sometimes walking  Makes it better?:  Aleve  Makes it worse?:  Walking at times  Able to weight bare?:  yes  Any mechanial symptoms since injury onset like locking of knee, catching of the knee or giving away of knee?: no  Any history of prior knee problems?: no    Current Medications[1]   Past Medical History[2]   Social History:  Short Social Hx on File[3]     REVIEW OF SYSTEMS:   GENERAL HEALTH: feels well otherwise, no fever.   SKIN: denies any unusual skin lesions or rashes.  Denies any skin color changes or temperature changes.  RESPIRATORY: denies shortness of breath with exertion  CARDIOVASCULAR: denies chest pain on exertion  NEURO: denies headaches, no numbness  MSK:  Knee pain as above.     EXAM:   BP 92/60   Pulse  95   Resp 21   Ht 5' 6\" (1.676 m)   Wt 137 lb (62.1 kg)   LMP 04/16/2025   SpO2 99%   BMI 22.11 kg/m²   GENERAL: well developed, well nourished,in no apparent distress  SKIN: no rashes,no suspicious lesions.  Skin color of bilateral calfs and knees are normal.  No erythema or ecchymosis appreciated. Slight effusion located in anteromedial and posterior regions of right knee.  EXTREMITIES: no cyanosis, clubbing or edema  MSK:KNEE:    Palpation: No Tenderness along medial joint line. No tenderness along lateral joint line.  No tenderness over superior and inferior poles of patella   ROM:   Range of Motion in degrees:     Knee Extension Knee Flexion   Left 0 130   Right 0 130      Right Knee Exam Tests  Patella Testing: Pattela Apprehension test negative   ACL Testing: Negative Anterior drawer test  PCL testing: Negative Posterior drawer test,   LCL & MCL Testing: Lateral collateral ligament intact., Medial collateral ligament intact.  Knee is stable to varus and valgus stress.  Meniscus Testing: No lateral joint line tenderness., No medial joint line tenderness., Maral's tests negative.,   No tenderness with tibial tubercle palpation assessing for Osgood Schlatter.   No palpable Baker's cyst.      NEURO:  No focal deficits.  CMS intact. Sensation intact of lower extremity bilaterally  VASCULAR: Extremities warm and well-perfused.      ASSESSMENT AND PLAN:   1. Acute pain of right knee  Suspect overuse injury from walking.  Recommend continuing Aleve for pain relief and swelling decrease since patient stated her pain is improved since starting the Aleve.  Have low suspicion for a blood clot given no mechanism injury, no sedentary activity and physical exam did not show typical symptoms of a deep vein thrombosis in the calf.  However patient stated that she has pain in her calf that she describes as \"feels like something is pulling\" so venous Doppler ultrasound was ordered to confirm no deep vein thrombosis is  present.  Will update patient with ultrasound results when received  - US VENOUS DOPPLER LEG RIGHT - DIAG IMG (CPT=93971); Future      Meds, Orders, & Refills for this Visit:  Requested Prescriptions      No prescriptions requested or ordered in this encounter         Imaging & Consults:  US VENOUS DOPPLER LEG RIGHT - DIAG IMG (CPT=93971)  No orders of the defined types were placed in this encounter.       Patient/Caregiver Education: Patient/Caregiver Education: There are no barriers to learning. Medical education done.   Outcome: Alyssa Bush verbalizes understanding, agrees with the plan, and had no other questions at the end of today's visit. Alyssa Bush is informed to call with any questions, complications, allergies, or worsening or changing symptoms.  Alyssa Bush is to call with any side effects or complications from the treatments as a result of today.     Follow-up in   No follow-ups on file.        Health Maintenance:  Health Maintenance Due   Topic Date Due    Mammogram  03/17/2023    COVID-19 Vaccine (4 - 2024-25 season) 09/01/2024    Annual Depression Screening  01/01/2025         Problem List:  Problem List[4]    Note to patient: The 21st Century Cures Act makes medical notes like these available to patients in the interest of transparency. However, this is a medical document intended as peer to peer communication. It is written in medical language and may contain abbreviations or verbiage that are unfamiliar. It may appear blunt or direct. Medical documents are intended to carry relevant information, facts as evident, and the clinical opinion of the practitioner.         [1]   Current Outpatient Medications   Medication Sig Dispense Refill    ALPRAZolam 0.25 MG Oral Tab Take 1 tablet (0.25 mg total) by mouth 2 (two) times daily as needed for Anxiety. 10 tablet 3    SUMAtriptan 50 MG Oral Tab Take 1 tablet (50 mg total) by mouth daily as needed for Migraine. May repeat in 2 hr if needed. Max dose  100mg/24 hr 9 tablet 1    Omeprazole 40 MG Oral Capsule Delayed Release Take 1 capsule (40 mg total) by mouth daily. 90 capsule 3   [2]   Past Medical History:   Acute gastritis without mention of hemorrhage    Allergic rhinitis due to other allergen    Anemia    during pregnancy/iron transfusions    GERD (gastroesophageal reflux disease)    Hypothyroidism    Rash and other nonspecific skin eruption   [3]   Social History  Socioeconomic History    Marital status:    Tobacco Use    Smoking status: Never    Smokeless tobacco: Never    Tobacco comments:     Non-smoker   Vaping Use    Vaping status: Never Used   Substance and Sexual Activity    Alcohol use: No    Drug use: No     Social Drivers of Health     Food Insecurity: No Food Insecurity (5/14/2025)    NCSS - Food Insecurity     Worried About Running Out of Food in the Last Year: No     Ran Out of Food in the Last Year: No   Transportation Needs: No Transportation Needs (5/14/2025)    NCSS - Transportation     Lack of Transportation: No   Housing Stability: Not At Risk (5/14/2025)    NCSS - Housing/Utilities     Has Housing: Yes     Worried About Losing Housing: No     Unable to Get Utilities: No   [4]   Patient Active Problem List  Diagnosis    GERD (gastroesophageal reflux disease)    Family history of breast cancer in female

## 2025-06-16 ENCOUNTER — APPOINTMENT (OUTPATIENT)
Dept: GENERAL RADIOLOGY | Age: 45
End: 2025-06-16
Attending: EMERGENCY MEDICINE
Payer: COMMERCIAL

## 2025-06-16 ENCOUNTER — HOSPITAL ENCOUNTER (EMERGENCY)
Age: 45
Discharge: HOME | End: 2025-06-16
Attending: EMERGENCY MEDICINE
Payer: COMMERCIAL

## 2025-06-16 VITALS
DIASTOLIC BLOOD PRESSURE: 65 MMHG | SYSTOLIC BLOOD PRESSURE: 97 MMHG | OXYGEN SATURATION: 99 % | HEIGHT: 66 IN | RESPIRATION RATE: 18 BRPM | TEMPERATURE: 99 F | HEART RATE: 80 BPM | BODY MASS INDEX: 22.02 KG/M2 | WEIGHT: 137 LBS

## 2025-06-16 DIAGNOSIS — E86.0 DEHYDRATION: ICD-10-CM

## 2025-06-16 DIAGNOSIS — U07.1 COVID: Primary | ICD-10-CM

## 2025-06-16 LAB
ALBUMIN SERPL-MCNC: 4.7 G/DL (ref 3.2–4.8)
ALBUMIN/GLOB SERPL: 1.5 {RATIO} (ref 1–2)
ALP LIVER SERPL-CCNC: 63 U/L (ref 37–98)
ALT SERPL-CCNC: 16 U/L (ref 10–49)
ANION GAP SERPL CALC-SCNC: 7 MMOL/L (ref 0–18)
AST SERPL-CCNC: 20 U/L (ref ?–34)
B-HCG UR QL: NEGATIVE
BASOPHILS # BLD AUTO: 0.04 X10(3) UL (ref 0–0.2)
BASOPHILS NFR BLD AUTO: 0.7 %
BILIRUB SERPL-MCNC: 0.5 MG/DL (ref 0.3–1.2)
BUN BLD-MCNC: 8 MG/DL (ref 9–23)
CALCIUM BLD-MCNC: 9.3 MG/DL (ref 8.7–10.6)
CHLORIDE SERPL-SCNC: 103 MMOL/L (ref 98–112)
CK SERPL-CCNC: 128 U/L (ref 34–145)
CO2 SERPL-SCNC: 24 MMOL/L (ref 21–32)
CREAT BLD-MCNC: 0.65 MG/DL (ref 0.55–1.02)
EGFRCR SERPLBLD CKD-EPI 2021: 111 ML/MIN/1.73M2 (ref 60–?)
EOSINOPHIL # BLD AUTO: 0.02 X10(3) UL (ref 0–0.7)
EOSINOPHIL NFR BLD AUTO: 0.3 %
ERYTHROCYTE [DISTWIDTH] IN BLOOD BY AUTOMATED COUNT: 14.8 %
GLOBULIN PLAS-MCNC: 3.1 G/DL (ref 2–3.5)
GLUCOSE BLD-MCNC: 110 MG/DL (ref 70–99)
HCT VFR BLD AUTO: 35.2 % (ref 35–48)
HGB BLD-MCNC: 12 G/DL (ref 12–16)
IMM GRANULOCYTES # BLD AUTO: 0.03 X10(3) UL (ref 0–1)
IMM GRANULOCYTES NFR BLD: 0.5 %
LIPASE SERPL-CCNC: 53 U/L (ref 12–53)
LYMPHOCYTES # BLD AUTO: 0.34 X10(3) UL (ref 1–4)
LYMPHOCYTES NFR BLD AUTO: 5.5 %
MAGNESIUM SERPL-MCNC: 1.9 MG/DL (ref 1.6–2.6)
MCH RBC QN AUTO: 28 PG (ref 26–34)
MCHC RBC AUTO-ENTMCNC: 34.1 G/DL (ref 31–37)
MCV RBC AUTO: 82.2 FL (ref 80–100)
MONOCYTES # BLD AUTO: 0.32 X10(3) UL (ref 0.1–1)
MONOCYTES NFR BLD AUTO: 5.2 %
NEUTROPHILS # BLD AUTO: 5.38 X10 (3) UL (ref 1.5–7.7)
NEUTROPHILS # BLD AUTO: 5.38 X10(3) UL (ref 1.5–7.7)
NEUTROPHILS NFR BLD AUTO: 87.8 %
OSMOLALITY SERPL CALC.SUM OF ELEC: 277 MOSM/KG (ref 275–295)
PLATELET # BLD AUTO: 287 10(3)UL (ref 150–450)
POTASSIUM SERPL-SCNC: 3.2 MMOL/L (ref 3.5–5.1)
PROT SERPL-MCNC: 7.8 G/DL (ref 5.7–8.2)
RBC # BLD AUTO: 4.28 X10(6)UL (ref 3.8–5.3)
SODIUM SERPL-SCNC: 134 MMOL/L (ref 136–145)
TROPONIN I SERPL HS-MCNC: <3 NG/L (ref ?–34)
WBC # BLD AUTO: 6.1 X10(3) UL (ref 4–11)

## 2025-06-16 PROCEDURE — 83735 ASSAY OF MAGNESIUM: CPT | Performed by: EMERGENCY MEDICINE

## 2025-06-16 PROCEDURE — 99285 EMERGENCY DEPT VISIT HI MDM: CPT

## 2025-06-16 PROCEDURE — 80053 COMPREHEN METABOLIC PANEL: CPT | Performed by: EMERGENCY MEDICINE

## 2025-06-16 PROCEDURE — S0119 ONDANSETRON 4 MG: HCPCS

## 2025-06-16 PROCEDURE — 83690 ASSAY OF LIPASE: CPT | Performed by: EMERGENCY MEDICINE

## 2025-06-16 PROCEDURE — 71045 X-RAY EXAM CHEST 1 VIEW: CPT | Performed by: EMERGENCY MEDICINE

## 2025-06-16 PROCEDURE — 82550 ASSAY OF CK (CPK): CPT | Performed by: EMERGENCY MEDICINE

## 2025-06-16 PROCEDURE — 84484 ASSAY OF TROPONIN QUANT: CPT | Performed by: EMERGENCY MEDICINE

## 2025-06-16 PROCEDURE — 93010 ELECTROCARDIOGRAM REPORT: CPT

## 2025-06-16 PROCEDURE — 96374 THER/PROPH/DIAG INJ IV PUSH: CPT

## 2025-06-16 PROCEDURE — 85025 COMPLETE CBC W/AUTO DIFF WBC: CPT | Performed by: EMERGENCY MEDICINE

## 2025-06-16 PROCEDURE — 81025 URINE PREGNANCY TEST: CPT

## 2025-06-16 PROCEDURE — 93005 ELECTROCARDIOGRAM TRACING: CPT

## 2025-06-16 PROCEDURE — 96361 HYDRATE IV INFUSION ADD-ON: CPT

## 2025-06-16 PROCEDURE — 96375 TX/PRO/DX INJ NEW DRUG ADDON: CPT

## 2025-06-16 RX ORDER — POTASSIUM CHLORIDE 1500 MG/1
40 TABLET, EXTENDED RELEASE ORAL ONCE
Status: COMPLETED | OUTPATIENT
Start: 2025-06-16 | End: 2025-06-16

## 2025-06-16 RX ORDER — ACETAMINOPHEN 500 MG
1000 TABLET ORAL ONCE
Status: COMPLETED | OUTPATIENT
Start: 2025-06-16 | End: 2025-06-16

## 2025-06-16 RX ORDER — KETOROLAC TROMETHAMINE 15 MG/ML
15 INJECTION, SOLUTION INTRAMUSCULAR; INTRAVENOUS ONCE
Status: COMPLETED | OUTPATIENT
Start: 2025-06-16 | End: 2025-06-16

## 2025-06-16 RX ORDER — ONDANSETRON 4 MG/1
4 TABLET, ORALLY DISINTEGRATING ORAL EVERY 4 HOURS PRN
Qty: 10 TABLET | Refills: 0 | Status: SHIPPED | OUTPATIENT
Start: 2025-06-16 | End: 2025-06-23

## 2025-06-16 RX ORDER — METOCLOPRAMIDE HYDROCHLORIDE 5 MG/ML
5 INJECTION INTRAMUSCULAR; INTRAVENOUS ONCE
Status: COMPLETED | OUTPATIENT
Start: 2025-06-16 | End: 2025-06-16

## 2025-06-16 RX ORDER — ONDANSETRON 4 MG/1
4 TABLET, ORALLY DISINTEGRATING ORAL ONCE
Status: COMPLETED | OUTPATIENT
Start: 2025-06-16 | End: 2025-06-16

## 2025-06-16 NOTE — ED PROVIDER NOTES
Patient Seen in: [facility] In [place]        History  Chief Complaint   Patient presents with    Nausea/Vomiting/Diarrhea     Stated Complaint: reports n/v since yesterday - positive home covid test yesterday    Subjective:   HPI            45-year-old with a history of GERD, hypothyroidism, migraines presents for evaluation of vomiting and fever.  Some of the history is provided by her  who accompanies her.  2 days ago patient developed a cough, congestion, headache.  She has chest discomfort.  She has intermittent abdominal discomfort.  She has been nauseous and multiple episodes of vomiting.  No diarrhea.  Has diffuse bodyaches.  Did a COVID test at home yesterday and was positive.  Last vaccine was over 2 years ago.  Outside records reviewed patient seen by PA for knee pain in May 2025.  Hemoglobin A1c was 5.5 in November.      Objective:     Past Medical History:    Acute gastritis without mention of hemorrhage    Allergic rhinitis due to other allergen    Anemia    during pregnancy/iron transfusions    GERD (gastroesophageal reflux disease)    Hypothyroidism    Migraines    Rash and other nonspecific skin eruption              Past Surgical History:   Procedure Laterality Date            , 2017     ×2    Other surgical history  3/2012    EGD                Social History     Socioeconomic History    Marital status:    Tobacco Use    Smoking status: Never    Smokeless tobacco: Never    Tobacco comments:     Non-smoker   Vaping Use    Vaping status: Never Used   Substance and Sexual Activity    Alcohol use: No    Drug use: No     Social Drivers of Health     Food Insecurity: No Food Insecurity (2025)    NCSS - Food Insecurity     Worried About Running Out of Food in the Last Year: No     Ran Out of Food in the Last Year: No   Transportation Needs: No Transportation Needs (2025)    NCSS - Transportation     Lack of Transportation: No    Housing Stability: Not At Risk (5/14/2025)    NCSS - Housing/Utilities     Has Housing: Yes     Worried About Losing Housing: No     Unable to Get Utilities: No                                Physical Exam    ED Triage Vitals [06/16/25 1106]   /66   Pulse 99   Resp 20   Temp (!) 101.3 °F (38.5 °C)   Temp src Temporal   SpO2 99 %   O2 Device None (Room air)       Current Vitals:   Vital Signs  BP: 113/78  Pulse: 88  Resp: 18  Temp: 98.8 °F (37.1 °C)  Temp src: Oral    Oxygen Therapy  SpO2: 99 %  O2 Device: None (Room air)        Physical Exam  General: Patient is awake, alert in no acute distress.   HEENT:   Sclera are not icteric.  Conjunctivae within normal limits.  Mucous members are moist.   Cardiovascular: Regular rate and rhythm, normal S1-S2.  Respiratory: Lungs are clear to auscultation bilaterally.   Abdomen: Soft, nontender, nondistended.  Extremities: No edema.  No unilateral calf swelling or calf tenderness to palpation.  Neuro: No facial droop.  No dysarthria or aphasia.  Moves all extremities.  Patient is alert and answers questions appropriately        ED Course  Labs Reviewed   COMP METABOLIC PANEL (14) - Abnormal; Notable for the following components:       Result Value    Glucose 110 (*)     Sodium 134 (*)     Potassium 3.2 (*)     BUN 8 (*)     All other components within normal limits   CBC WITH DIFFERENTIAL WITH PLATELET - Abnormal; Notable for the following components:    Lymphocyte Absolute 0.34 (*)     All other components within normal limits   LIPASE - Normal   TROPONIN I HIGH SENSITIVITY - Normal   CK CREATINE KINASE (NOT CREATININE) - Normal   MAGNESIUM - Normal   POCT PREGNANCY URINE - Normal     EKG    Rate, intervals and axes as noted on EKG Report.  Rate: 83  Rhythm: Sinus Rhythm  Reading: No ST elevation or depression.  No dysrhythmia.  Normal intervals including QTc 418            Chest x-ray: I personally reviewed the radiographs and my individual interpretation shows no  consolidation.  I also reviewed the official report which showed no pleural effusion.  Toradol Tylenol Zofran fluids ordered  Reglan ordered 40 mill equivalents potassium ordered                  MDM     History is obtained from:     Previous records reviewed as noted in HPI    Differential includes, but is not limited to, pneumonia, sepsis, COVID, renal failure, hypokalemia, STEMI    Review of any laboratory testing: CMP with mild hypokalemia, hyponatremia consistent with dehydration/vomiting.  CBC normal.  Lipase normal.  Troponin normal.  CK and magnesium normal.     Review of any radiographic studies: Chest x-ray unremarkable    Shared decision making with the patient.  On reevaluation patient feels much improved.  Workup reassuring.  Return precautions given.    The administration of these medications were addressed : Zofran, Paxlovid                             Medical Decision Making      Disposition and Plan     Clinical Impression:  1. COVID    2. Dehydration         Disposition:  Discharge  6/16/2025  2:31 pm    Follow-up:  Maude Butt MD  [id number] [address]  [phone]    Schedule an appointment as soon as possible for a visit in 1 week(s)  As needed          Medications Prescribed:  Current Discharge Medication List        START taking these medications    Details   nirmatrelvir-ritonavir 300-100 MG Oral Tablet Therapy Pack Take two nirmatrelvir tablets (300mg) with one ritonavir tablet (100mg) together twice daily for 5 days.  Qty: 30 tablet, Refills: 0      ondansetron 4 MG Oral Tablet Dispersible Take 1 tablet (4 mg total) by mouth every 4 (four) hours as needed for Nausea.  Qty: 10 tablet, Refills: 0                   Supplementary Documentation:

## 2025-06-16 NOTE — DISCHARGE INSTRUCTIONS
Return for new or worsening symptoms such as further vomiting, shortness of breath, coughing up blood    You can take acetaminophen 500 mg and ibuprofen 200 mg together every 4-6 hours for pain as needed.

## 2025-06-17 LAB
ATRIAL RATE: 83 BPM
P AXIS: -18 DEGREES
P-R INTERVAL: 146 MS
Q-T INTERVAL: 356 MS
QRS DURATION: 78 MS
QTC CALCULATION (BEZET): 418 MS
R AXIS: 21 DEGREES
T AXIS: 39 DEGREES
VENTRICULAR RATE: 83 BPM

## 2025-06-26 ENCOUNTER — OFFICE VISIT (OUTPATIENT)
Dept: FAMILY MEDICINE CLINIC | Facility: CLINIC | Age: 45
End: 2025-06-26
Payer: COMMERCIAL

## 2025-06-26 VITALS
BODY MASS INDEX: 21.69 KG/M2 | DIASTOLIC BLOOD PRESSURE: 82 MMHG | SYSTOLIC BLOOD PRESSURE: 112 MMHG | RESPIRATION RATE: 18 BRPM | HEART RATE: 100 BPM | WEIGHT: 135 LBS | OXYGEN SATURATION: 98 % | TEMPERATURE: 98 F | HEIGHT: 66 IN

## 2025-06-26 DIAGNOSIS — L30.1 DYSHIDROTIC ECZEMA: ICD-10-CM

## 2025-06-26 DIAGNOSIS — R05.8 POST-VIRAL COUGH SYNDROME: Primary | ICD-10-CM

## 2025-06-26 PROCEDURE — 99214 OFFICE O/P EST MOD 30 MIN: CPT | Performed by: FAMILY MEDICINE

## 2025-06-26 RX ORDER — BENZONATATE 200 MG/1
200 CAPSULE ORAL 3 TIMES DAILY PRN
Qty: 30 CAPSULE | Refills: 0 | Status: SHIPPED | OUTPATIENT
Start: 2025-06-26

## 2025-06-26 RX ORDER — PREDNISONE 20 MG/1
TABLET ORAL
Qty: 13 TABLET | Refills: 0 | Status: SHIPPED | OUTPATIENT
Start: 2025-06-26 | End: 2025-07-04

## 2025-06-26 RX ORDER — FLUTICASONE PROPIONATE 50 MCG
2 SPRAY, SUSPENSION (ML) NASAL DAILY
Qty: 1 EACH | Refills: 0 | Status: SHIPPED | OUTPATIENT
Start: 2025-06-26 | End: 2026-06-21

## 2025-06-26 RX ORDER — DOXYCYCLINE 100 MG/1
100 CAPSULE ORAL 2 TIMES DAILY
Qty: 14 CAPSULE | Refills: 0 | Status: SHIPPED | OUTPATIENT
Start: 2025-06-26 | End: 2025-07-03

## 2025-06-26 RX ORDER — MOMETASONE FUROATE 1 MG/G
1 CREAM TOPICAL 2 TIMES DAILY
Qty: 60 G | Refills: 0 | Status: SHIPPED | OUTPATIENT
Start: 2025-06-26 | End: 2025-07-03

## 2025-06-26 RX ORDER — ALBUTEROL SULFATE 90 UG/1
INHALANT RESPIRATORY (INHALATION)
Qty: 1 EACH | Refills: 0 | Status: SHIPPED | OUTPATIENT
Start: 2025-06-26

## 2025-06-26 NOTE — PATIENT INSTRUCTIONS
Skip to OutlineSkip to TopicSkip to References         .5  Sign out      Back  Patient education: Eczema (atopic dermatitis) (Beyond the Basics)                             Outline  ECZEMA OVERVIEW  ECZEMA CAUSES  ECZEMA SYMPTOMS  ECZEMA DIAGNOSIS  ECZEMA TREATMENT  Who treats eczema?  Identifying triggers  Keeping your skin hydrated  Emollients  Bathing  Treating skin irritation  Topical steroids  Topical calcineurin inhibitors  Other skin treatments  Oral steroids  Ultraviolet light therapy (phototherapy)  Injectable medications  Immunosuppressive drugs  Relieving itching  Oral antihistamines  Wet dressings  Mood problems  CAN ECZEMA BE PREVENTED?  WHERE TO GET MORE INFORMATION  Patient level information  The Basics  Beyond the Basics  Professional level information  ACKNOWLEDGMENT  REFERENCES  GRAPHICSView All  Pictures  Eczema infant PI  Eczema child PI  Eczema eyelids PI  Dry skin eczema PI  Skin thickening eczema PI  Eczema arm PI  RELATED TOPICS  Approach to the patient with a scalp disorder  Atopic dermatitis (eczema): Pathogenesis, clinical manifestations, and diagnosis  Introducing formula to infants at risk for allergic disease  Management of severe, refractory atopic dermatitis (eczema) in children  Patient education: Eczema (atopic dermatitis) (The Basics)  Patient education: Food allergy symptoms and diagnosis (Beyond the Basics)  Patient education: Giving your child medicines (The Basics)  Patient education: How to use topical medicines (The Basics)  Patient education: Itchy skin (The Basics)  Patient education: Melasma (The Basics)  Patient education: Peanut, tree nut, and seed allergy (The Basics)  Patient education: Phototherapy (The Basics)  Patient education: Seborrheic dermatitis (The Basics)  Patient education: Topical corticosteroid medicines (The Basics)  Primary prevention of allergic disease: Maternal diet in pregnancy and lactation  Role of food and environmental allergies in atopic  dermatitis (eczema)  The impact of breastfeeding on the development of allergic disease  Treatment of atopic dermatitis (eczema)    Author:  Ean Winchester MD  Section :  Tyshawn Garcia MD, PhD, [facility]  [place] Editors:  Rupa Gore MD, FACP  Polina Staley MD, DSc  Contributor Disclosures  All topics are updated as new evidence becomes available and our peer review process is complete.  Literature review current through: May 2025.  This topic last updated: Mar 25, 2025.    Please read the Disclaimer at the end of this page.  ECZEMA OVERVIEW   Eczema, also known as atopic dermatitis, is a skin problem that causes dry, itchy, scaly, red skin. It can affect infants, children, and adults and seems more common in certain families. Eczema can be treated in most cases with moisturizers and prescription ointments and creams.  ECZEMA CAUSES   Although the cause of eczema is not completely understood, genetics appear to play a strong role, and people with a family history of eczema are at increased risk of developing the condition. In most people with eczema, there is a genetic abnormality in the outermost layer of the skin, called the epidermis. The epidermis is the first line of defense between the body and the environment. When the epidermis is intact, it keeps environmental irritants, allergens, and microbes from entering the body and prevents the skin from losing too much water. In people with eczema, this barrier is less strong and more permeable than it should be.  Despite popular belief, in children eczema is rarely linked to food allergies. If you think your child might have a food allergy, you should consult a pediatric allergy specialist for evaluation. (See \"Patient education: Food allergy symptoms and diagnosis (Beyond the Basics)\".)  ECZEMA SYMPTOMS   Most people with eczema develop their first symptoms as children, before the age of five. Intense itching of the skin, patches of inflamed skin,  small bumps, and skin flaking are common. Scratching can further inflame the skin and worsen the itching. The itchiness may be more noticeable at nighttime.  Eczema symptoms vary from one person to another and can change over time. Although eczema is usually limited to specific areas of the body, it may affect multiple areas in severe cases:  ?In infants, there may be inflamed, scaly, and crusted areas on the front of the arms and legs, cheeks (picture 1), or scalp. The diaper area is not usually affected.  ?In children and adults, eczema commonly affects the back of the neck, the elbow creases, and the backs of the knees (picture 2). Other affected areas may include the face (picture 3), trunk (picture 4), wrists, and forearms. The skin may become thickened and darkened, or even scarred, from repeated scratching (picture 5).  Scratching can also lead to infection of the skin. Signs of infection include painful red bumps that sometimes contain pus; if you think you might have an infection, consult your doctor or nurse, as you may need treatment.  Other findings in people with eczema can include:  ?Dry, scaly skin  ?Plugged hair follicles causing small bumps, usually on the face, upper arms, and thighs (picture 6)  ?Increased skin creasing on the palms and/or an extra fold of skin under the eye  ?Darkening of the skin around the eyes  ECZEMA DIAGNOSIS   There is no specific test used to diagnose eczema. The diagnosis is usually based on your medical history, symptoms, and physical examination.  Factors that strongly suggest eczema include long-term and recurrent itching, symptoms that began at a young age, and a personal or family history of certain allergic conditions (including asthma and seasonal allergies as well as eczema). Another factor to consider is symptoms that get worse after exposure to certain triggers. (See 'Identifying triggers' below.)  ECZEMA TREATMENT   Eczema is a chronic condition; it typically  improves and then flares (gets worse) periodically. Some people have no symptoms for several years. Eczema is not curable, although it is possible to control your symptoms with a variety of self-care measures and medications.  Who treats eczema? -- Many people with eczema can initially be treated by their primary care provider. However, you may need to see a dermatologist (skin specialist) in certain situations, such as if your condition does not improve with treatment, if certain areas of your body are affected (face or skin folds), or if another condition could be causing symptoms.  Identifying triggers -- Eliminating factors that aggravate your eczema symptoms can help to control the symptoms. Possible triggers may include:  ?Cold or dry environments  ?Sweating  ?Emotional stress or anxiety  ?Rapid temperature changes  ?Exposure to certain chemicals or cleaning solutions, including soaps and detergents, perfumes and cosmetics, wool or synthetic fibers, dust, sand, and cigarette smoke  Keeping your skin hydrated  Emollients -- Emollients are lotions, creams, and ointments that moisturize the skin and prevent it from drying out. The best emollients for people with eczema are thick creams (such as Eucerin, Cetaphil, and Nutraderm) or ointments (such as petroleum jelly, Aquaphor, and Vaseline), which contain little to no water. Emollients are most effective when applied immediately after bathing. Emollients can be applied twice a day or more often if needed. Lotions contain more water than creams and ointments and are less effective for moisturizing the skin.  Bathing -- It is not clear if showers or baths are better for keeping the skin hydrated. Lukewarm baths or showers can hydrate and cool the skin, temporarily relieving itching from eczema. An unscented, mild soap or non-soap cleanser (such as Cetaphil) should be used sparingly. Apply an emollient immediately after bathing or showering to prevent your skin from  drying out as a result of water evaporation. Emollient bath additives (products you add to the bath water) have not been found to help relieve symptoms.  Hot or long baths (more than 10 to 15 minutes) and showers should be avoided since they can dry out the skin.  In some cases, health care providers may recommend dilute bleach baths for people with eczema. These baths help to decrease the number of bacteria on the skin that can cause infections or worsen symptoms. To prepare a bleach bath, one-fourth to one-half cup of bleach is placed in a full bathtub (about 40 gallons) of water. Bleach baths are usually taken for 5 to 10 minutes twice per week and should be followed by the application of an emollient.  Treating skin irritation  Topical steroids -- Your doctor may suggest a steroid (also called \"corticosteroid\") cream or ointment if you have mild to moderate eczema. Steroid creams and ointments are available in different strengths; the least potent are available without a prescription (eg, hydrocortisone 1% cream). Stronger versions require a prescription.  Steroid creams or ointments are usually applied to the skin once or twice per day. They help to reduce symptoms and moisturize your skin. As the skin improves, you can switch to a non-medicated emollient. Strong topical steroids may be needed to control severe flares of eczema; however, these should be used for only short periods of time to prevent thinning of the skin that can result from long-term use.  Topical calcineurin inhibitors -- If you have mild to moderate eczema on sensitive areas, such as the face or groin, your doctor might suggest an ointment or cream that contains a calcineurin inhibitor. Examples include tacrolimus ointment (brand name: Protopic) and pimecrolimus cream (brand name: Elidel). These are effective in controlling eczema, although they do not work as quickly as topical steroids. They can be used in adults and children over age two.  Due to concerns about the long-term safety of these medications, it's important to carefully follow your health care provider's instructions about how and how long to use them.  Other skin treatments -- Other skin treatments for eczema include:  ?Phosphodiesterase 4 (PDE4) inhibitors - These come in an ointment or cream and can be used for mild to moderate eczema. These include crisaborole (brand name: Eucrisa), which can be used in adults and children over three months old, and roflumilast (brand name: Zoryve), which can be used in adults and children over six years old.  ?Janus kinase (COLLIN) inhibitors - Ruxolitinib (brand name: Opzelura) comes as a cream and can be used in adults and children over 12 years old. Your doctor might recommend it if your symptoms are not controlled with other topical treatments. This medicine might put you at higher risk for certain health problems, such as certain infections and cardiac events. Talk to your health care provider about the risks of using this medicine.  ?Tapinarof (brand name: Vtama) - This comes as a cream and can be used in adults and children over two years old.  Oral steroids -- Oral steroids (eg, prednisone) occasionally are used for a very short period of time to treat a severe flare of eczema, although this treatment is not usually recommended on a regular basis or for prolonged periods of time because of potential side effects.  Ultraviolet light therapy (phototherapy) -- Ultraviolet light therapy (also called phototherapy) can effectively control eczema. However, this therapy is expensive and may increase your risk of skin cancer, and is therefore recommended only for people with severe eczema whose symptoms do not respond to other treatments.  Injectable medications -- The injectable \"biologic\" medication dupilumab (brand name: Dupixent) may be beneficial for treating eczema. Due to its high cost and potential side effects, this drug is reserved for adults and  children aged six months and older with moderate to severe eczema that has not responded to other treatments.  Other similar injectable medications called tralokinumab (brand name: Adbry), lebrikizumab (brand name: Ebglyss), and nemolizumab (brand name: Nemluvio) can be used in children aged 12 years and older and in adults. Some oral medications called COLLIN inhibitors, such as upadacitinib (brand name: Rinvoq), can also be used in children aged 12 years and older and in adults.  Immunosuppressive drugs -- Drugs that weaken the immune system may be recommended for people with severe eczema who do not improve with other treatments. However, treatment with these drugs can cause serious side effects, including an increased risk for infection. They are not recommended for use in infants or young children.  Relieving itching  Oral antihistamines -- Oral antihistamines sometimes help relieve the itching of eczema. The over-the-counter antihistamine diphenhydramine (sample brand name: Benadryl) and prescription antihistamines, such as hydroxyzine (sample brand names: Vistaril, Atarax) and cyproheptadine, cause drowsiness; these may be especially beneficial for people who have trouble sleeping due to itching.  The nonsedating antihistamines such as cetirizine (brand name: Zyrtec) and loratadine (sample brand names: Alavert, Claritin) may relieve itching; both are available without a prescription in the [place] and cause less drowsiness.  Wet dressings -- Wet dressings (wet wraps) help soothe and hydrate the skin, reduce itching and redness, loosen crusted areas, and prevent skin injury due to scratching. Dampened cotton garments (eg, gauze) may be worn over the affected area and covered with a dry garment. You can wear these dressings for a few days, overnight, or during the day (change to a fresh dressing every eight hours).  Mood problems -- Talk to your health care provider if your eczema is making you feel anxious  or depressed. There are treatments that can help with this.  CAN ECZEMA BE PREVENTED?   Experts don't know if there is a way to prevent eczema. Babies who have a parent or sibling with eczema have a higher risk of developing eczema as well. For these babies, good skin care might be helpful, especially in cold or dry climates. Good skin care includes using moisturizing creams or ointments. However, doctors don't yet know if this actually helps prevent eczema later on.  If you do use cream or ointment on your , it's important to wash your hands first. This helps lower the risk of getting germs on the baby's skin that could cause infection. It's also a good idea to use products that come in a tube instead of a jar that you dip your fingers in.  WHERE TO GET MORE INFORMATION   Your health care provider is the best source of information for questions and concerns related to your medical problem.  This article will be updated as needed on our website ([email or web address]). Related topics for patients, as well as selected articles written for health care professionals, are also available. Some of the most relevant are listed below.  Patient level information -- [employer] offers two types of patient education materials.  The Basics -- The Basics patient education pieces answer the four or five key questions a patient might have about a given condition. These articles are best for patients who want a general overview and who prefer short, easy-to-read materials.  Patient education: Eczema (atopic dermatitis) (The Basics)  Patient education: Seborrheic dermatitis (The Basics)  Patient education: Giving your child medicines (The Basics)  Patient education: Melasma (The Basics)  Patient education: Peanut, tree nut, and seed allergy (The Basics)  Patient education: Topical corticosteroid medicines (The Basics)  Patient education: How to use topical medicines (The Basics)  Patient education: Itchy skin (The  Basics)  Patient education: Phototherapy (The Basics)  Beyond the Basics -- Beyond the Basics patient education pieces are longer, more sophisticated, and more detailed. These articles are best for patients who want in-depth information and are comfortable with some medical jargon.  Patient education: Food allergy symptoms and diagnosis (Beyond the Basics)  Professional level information -- Professional level articles are designed to keep doctors and other health professionals up-to-date on the latest medical findings. These articles are thorough, long, and complex, and they contain multiple references to the research on which they are based. Professional level articles are best for people who are comfortable with a lot of medical terminology and who want to read the same materials their doctors are reading.  Approach to the patient with a scalp disorder  Atopic dermatitis (eczema): Pathogenesis, clinical manifestations, and diagnosis  Introducing formula to infants at risk for allergic disease  Primary prevention of allergic disease: Maternal diet in pregnancy and lactation  The impact of breastfeeding on the development of allergic disease  Treatment of atopic dermatitis (eczema)  Management of severe, refractory atopic dermatitis (eczema) in children  Role of food and environmental allergies in atopic dermatitis (eczema)   The following organizations also provide reliable health information.  ?National Library of Medicine       (www.nlm.nih.gov/medlineplus/healthtopics.html)  ?[place] on Arthritis and Musculoskeletal and Skin Diseases       (www.niams.nih.gov/health-topics/atopic-dermatitis)  ?American Academy of Dermatology       (www.aad.org)  ?American Academy of Allergy, Asthma and Immunology       (www.aaaai.org)  ?National Eczema Association       (www.nationaleczema.org)  [1-4]  ACKNOWLEDGMENT   The [employer] editorial staff acknowledges Ean Paul MD, who contributed to earlier versions  of this topic review.  Use of [employer] is subject to the Terms of Use.  REFERENCES  Jadon LF, Freddy WL, Oskar SL, et al. Guidelines of care for the management of atopic dermatitis: section 1. Diagnosis and assessment of atopic dermatitis. J Am Acad Dermatol 2014; 70:338.  Bonnie A, Alice S, Dell T, et al. Consensus-based  guidelines for treatment of atopic eczema (atopic dermatitis) in adults and children: part II. J Eur Acad Dermatol Venereol 2018; 32:850.  Viktoriya MM, Jared AL, Section On Dermatology. Atopic dermatitis: skin-directed management. Pediatrics 2014; 134:e1735.  Ta E, Henok I, Gautam G, et al. Consensus Conference on Clinical Management of pediatric Atopic Dermatitis. Ital J Pediatr 2016; 42:26.    Disclaimer: This generalized information is a limited summary of diagnosis, treatment, and/or medication information. It is not meant to be comprehensive and should be used as a tool to help the user understand and/or assess potential diagnostic and treatment options. It does NOT include all information about conditions, treatments, medications, side effects, or risks that may apply to a specific patient. It is not intended to be medical advice or a substitute for the medical advice, diagnosis, or treatment of a health care provider based on the health care provider's examination and assessment of a patient's specific and unique circumstances. Patients must speak with a health care provider for complete information about their health, medical questions, and treatment options, including any risks or benefits regarding use of medications. This information does not endorse any treatments or medications as safe, effective, or approved for treating a specific patient. UpToDate, Inc. and its affiliates disclaim any warranty or liability relating to this information or the use thereof. The use of this information is governed by the Terms of Use, available at  https://www.[employer].GreenerU/en/know/clinical-effectiveness-terms. 2025© UpToDate, Inc. and its affiliates and/or licensors. All rights reserved.  Topic 7638 Version 34.0    Rate               Topic Feedback  Contact Us  Support Tag  About Us  [employer] Access  Language  Terms of Use  Policies  [facility]  Help & Training  [employer]®     [employer]®          Skip to TopicSkip to References         .0  Sign out      Back  Patient education: Chronic cough in adults (Beyond the Basics)                             Author:  Talat Owens MD, MACP, FRCP  [place] Editors:  JUAN JOSE Ovalles, DSc, FRCP, FRS  Diogo Philippe Jr, MD  [place] :  Jamie Blair MD  Contributor Disclosures  All topics are updated as new evidence becomes available and our peer review process is complete.  Literature review current through: May 2025.  This topic last updated: Oct 01, 2024.    Please read the Disclaimer at the end of this page.  OVERVIEW   Coughing from time to time helps clear particles and secretions from the lungs and helps to prevent infection. However, sometimes a cough can become a chronic condition. A chronic cough is usually defined as a cough that lasts for eight weeks or longer.  Although coughing is not usually a sign of a serious problem, it can be annoying. Coughing frequently is embarrassing, can make you physically tired, make it hard to sleep, and cause you to be dizzy, hoarse, strain muscles (or even break a rib), sweat, and leak urine (especially in females).  This article discusses the possible causes and treatments of chronic cough in adults. Coughing that began less than eight weeks ago is discussed separately. (See \"Patient education: Acute bronchitis in adults (Beyond the Basics)\".) More detailed information about chronic cough is available by subscription. (See \"Causes and epidemiology of subacute and chronic cough in adults\" and \"Evaluation and  treatment of subacute and chronic cough in adults\".)  CAUSES OF CHRONIC COUGH   The most common causes of chronic cough are postnasal drip, asthma, and acid reflux from the stomach. These three causes account for up to 90 percent of all cases of chronic cough. Less common causes include infections, medications, and a variety of lung diseases.  Postnasal drip -- Postnasal drip occurs when secretions from the nose drip or flow into the back of the throat from the nose. These secretions can irritate the throat and trigger a cough. Postnasal drip can develop in people with allergies, colds, rhinitis, and sinusitis.  Signs of postnasal drip include a stuffy or runny nose, a sensation of liquid in the back of the throat, and a feeling you need to clear your throat frequently. However, some people have so-called \"silent\" postnasal drip, which causes no symptoms other than a cough.  Asthma -- Asthma is the second most frequent cause of chronic cough in adults and is the leading cause in children. In addition to coughing, you may also wheeze or feel short of breath. However, some people have a condition known as cough variant asthma, in which cough is the only symptom of asthma. (See \"Patient education: Asthma treatment in adolescents and adults (Beyond the Basics)\".)  Asthma-related cough may be seasonal, may follow an upper respiratory infection, or may get worse with exposure to cold, dry air, or certain fumes or fragrances.  Acid reflux -- Gastroesophageal reflux, also known as acid reflux, occurs when acid from the stomach flows back (refluxes) into the esophagus, the tube connecting the stomach and the throat. Gastroesophageal reflux disease (GERD) refers to symptoms caused by acid reflux. Many people with cough due to acid reflux have heartburn or a sour taste in the mouth. However, some patients with GERD have cough as their only symptom. (See \"Patient education: Gastroesophageal reflux disease in adults (Beyond the  Basics)\".)  Other causes -- A number of other conditions can lead to chronic cough. These include:  Respiratory tract infection -- An upper respiratory infection such as a cold can cause a cough that lasts more than eight weeks. This may be due to postnasal drip (as described above), or to irritability in the airways that developed as a result of the infection. Many people with a chronic cough after a respiratory infection respond to treatment for postnasal drip or cough variant asthma. (See 'Postnasal drip' above.)  Sometimes bacterial infections of the sinuses or airways can develop following a viral upper respiratory tract infection. In almost all cases of bacterial airway infections, patients will have a cough that produces sputum. The sputum is colored from light yellow to dark green or even brown. Likewise, in almost all cases of cough from bacterial sinus infections, patients will have sinus congestion, and the nasal secretions that drip or flow into the back of the throat are similarly off-colored. If such colored sputum or postnasal drip continues unimproved for more than 10 to 14 days, antibiotics may be needed to treat the infection. (See \"Patient education: Acute bronchitis in adults (Beyond the Basics)\" and \"Patient education: Acute sinusitis (sinus infection) (Beyond the Basics)\".)  Use of ACE inhibitors -- Medications known as angiotensin-converting enzyme (ACE) inhibitors, which are commonly used to treat high blood pressure, cause a chronic cough in up to 20 percent of patients. The cough is usually dry and hacking. Switching to another medication often improves the cough over the course of one to two weeks.  Chronic bronchitis -- Chronic bronchitis is a condition in which the airways are irritated, causing you to cough, sometimes raising phlegm. Most people with chronic bronchitis are current or past smokers. (See \"Patient education: Chronic obstructive pulmonary disease (COPD) (Beyond the  Basics)\".)  Lung cancer -- Although lung cancer can cause coughing, very few people with a chronic cough have lung cancer. Cancer is possible, however, especially if you are a smoker and your cough changes suddenly, you begin to cough up blood, or if you continue to cough more than one month after quitting smoking. (See \"Patient education: Lung cancer risks, symptoms, and diagnosis (Beyond the Basics)\".)  Eosinophilic bronchitis -- A special type of inflammation in the airways called eosinophilic bronchitis can cause a chronic cough. This is diagnosed when your breathing tests show no evidence of asthma, but your phlegm or airway biopsy shows cells called eosinophils. Eosinophilic bronchitis is much less common than asthma.  Bronchiectasis -- Sometimes one or more severe or recurrent respiratory infections can damage airway walls, leading to widening of the affected airways, decreased ability to clear secretions, and chronic infection of those airways. Patients with bronchiectasis often have a chronic cough productive of white or discolored sputum, sometimes accompanied by blood. A diagnosis of bronchiectasis is typically confirmed by a chest CT scan.  Laryngeal hypersensitivity -- The larynx is the region of the upper airway that includes the vocal cords (voice box). Acute irritation of the larynx leads to reflexive cough to protect the lower airway and the lungs from entry of food or other foreign material. In some patients, recurrent irritation of the larynx by viral infection or gastroesophageal reflux is believed to initiate laryngeal hypersensitivity and an exaggerated cough response to a variety of stimuli that would not normally trigger coughing (eg, talking, deep breathing, strong smells). The diagnosis of laryngeal hypersensitivity is made after other causes listed above have been excluded.  CHRONIC COUGH DIAGNOSIS   To investigate the cause of a chronic cough, your healthcare provider will ask about your  symptoms and perform a physical examination.  Based upon your symptoms and examination, your clinician may recommend a trial of treatment before other testing is performed. If you improve with treatment, no further testing is generally needed. If you do NOT improve, or if your diagnosis is not clear, further testing may be recommended, such as:  Lung imaging -- If you are a current or former cigarette smoker, or if you have other medical conditions that can affect the lung, a chest X-ray or even a chest CT scan may be performed.  Lung function tests -- If asthma is suspected but cannot be confirmed, the clinician may perform lung function tests that measure the pattern of air flow into and out of the lungs.  Acid reflux testing -- To confirm a diagnosis of acid reflux, a test may be done to measure the acid level of fluid in the esophagus. This test is called a pH probe. In some people, a test called upper endoscopy will be done to look for irritation of the esophagus and to obtain a biopsy of the esophagus. (See \"Patient education: Upper endoscopy (Beyond the Basics)\" and \"Patient education: Gastroesophageal reflux disease in adults (Beyond the Basics)\".)  CHRONIC COUGH TREATMENT   Treatment of chronic cough aims to eliminate the underlying cause. Most of the time, each type of treatment is tried separately, one after another, instead of all at the same time. Seeing which one works best helps to figure out the underlying cause. On the other hand, a number of patients have more than one cause for their chronic cough. In such cases cough only resolves when all causes are successfully treated at the same time. Thus, if your clinician believes there is more than one cause of your cough, or if your cough is particularly disabling, treatment or evaluation of the likely causes may be pursued simultaneously. Once your cough has resolved, treatments can be stopped one at a time, starting with the treatment least likely to  have been helpful, observing you for any return of cough.  Here are some examples of treatment choices for different causes of cough.  Postnasal drip -- A cough related to postnasal drip may improve with the use of a decongestant, nasal or oral antihistamine, nasal glucocorticoid, or a nasal spray that contains ipratropium. The best treatment (or combination of treatments) depends upon your symptoms and medical history. The following are some examples of how these medications may be used:  If you have postnasal drip from a cold -- Antihistamines that are taken as a pill, such as chlorpheniramine (brand name: Chlor-Trimeton) or clemastine (Tavist, Dayhist), may help, but can cause side effects such as drowsiness and drying of the eyes, nose, and mouth. Most of these are available over the counter. Decongestants such as pseudoephedrine can improve nasal congestion, make it easier to blow one’s nose, and thus lessen postnasal drip. Most drugstores in the [place] carry pseudoephedrine behind the counter, so you must request it from the pharmacist (a prescription is not required). The nasal spray ipratropium bromide (Atrovent, available without a prescription) can also help relieve runny nose, postnasal drip, and sneezing associated with a cold.  If you have postnasal drip from allergies (“hay fever”) -- Nasal glucocorticoids can be used to help reduce nasal inflammation, postnasal drip, and cough. Some are available over-the-counter in the [place] (sample brand names: Flonase Allergy Relief, Rhinocort Allergy), while others require a prescription.  Oral antihistamines such as loratadine (Claritin), fexofenadine (Allegra), or cetirizine (Zyrtec) are also effective for allergic postnasal drip and are available without a prescription. They are less likely to cause sleepiness than older generation antihistamines such as chlorpheniramine or diphenhydramine. Nasal antihistamine sprays such as azelastine (Astelin)  can also relieve postnasal drip.  These forms of treatment (nasal glucocorticoids and oral or nasal antihistamines) can be tried alone, or they can be used in combination if needed.  Cough variant asthma -- If your cough is due to asthma, you will be given the standard treatment for asthma, which includes an inhaled glucocorticoid such as fluticasone (Flovent), budesonide (Pulmicort), or beclomethasone (QVAR). You may also be prescribed an inhaled bronchodilator such as albuterol (ProAir, Ventolin, or Proventil) if you have wheezing or shortness of breath. The glucocorticoid decreases inflammation of the airways while the inhaled bronchodilator opens up the airways. There are also commonly prescribed inhaled preparations that include both a glucocorticoid and a bronchodilator (eg, Symbicort, Advair). (See \"Patient education: Asthma treatment in adolescents and adults (Beyond the Basics)\".)  Acid reflux -- Cough due to acid reflux may respond to the following lifestyle changes:  ?Avoid substances that increase reflux, such as high-fat foods, chocolate, katie, red wine, acidic juices, and excessive alcohol  ?Avoid eating for two to three hours before lying down  ?Elevate the head of the bed six to eight inches  ?Lose weight, if you are overweight  ?Stop smoking  In addition, you may be given a medication to slow the production of acid in your stomach, called a proton pump inhibitor. Examples of proton pump inhibitors include omeprazole (Prilosec), esomeprazole (Nexium), and lansoprazole (Prevacid). It may take eight or more weeks of treatment before your cough fully improves. If your cough does not improve during this time, further testing may be recommended.  For more information about acid reflux, (see \"Patient education: Gastroesophageal reflux disease in adults (Beyond the Basics)\").  Eosinophilic bronchitis -- Eosinophilic bronchitis is treated with inhaled glucocorticoids. These medications are also used for  asthma and work to decrease inflammation in the airways. Examples include budesonide (Pulmicort) and fluticasone (Flovent).  Cough suppression -- If, after a thorough evaluation, the cause of your cough cannot be determined and the cough persists, a medication that suppresses your cough may be recommended. Possible options include:  ?Nonprescription cough medicines that contain dextromethorphan (Delsym) may help suppress the cough reflex.  ?Benzonatate (Tessalon) is a prescription medication that may be recommended if dextromethorphan is not helpful.  ?Codeine and hydrocodone are prescription opioid medications that can be added to cough syrup; these may be tried if other treatments have not been effective. However, both medications can cause you to feel sleepy and should not be used while working or driving.  ?Gabapentin or pregabalin, which are drugs more commonly used to ameliorate chronic pain by blocking nerve impulses, may be helpful in some patients with chronic cough. Both medications may have side effects, such as nausea and fatigue (with gabapentin) or dizziness, confusion, or difficulty concentrating (with pregabalin), and so they should be started at a low dose and the dose gradually increased only if needed and tolerated.  WHERE TO GET MORE INFORMATION   Your healthcare provider is the best source of information for questions and concerns related to your medical problem.  This article will be updated as needed on our web site ([email or web address]). Related topics for patients, as well as selected articles written for healthcare professionals, are also available. Some of the most relevant are listed below.  Patient level information -- [employer] offers two types of patient education materials.  The Basics -- The Basics patient education pieces answer the four or five key questions a patient might have about a given condition. These articles are best for patients who want a general overview and who  prefer short, easy-to-read materials.  Patient education: Lung cancer (The Basics)  Patient education: Acute bronchitis in adults (The Basics)  Patient education: Coughing up blood (The Basics)  Patient education: Cough in adults (The Basics)  Patient education: Idiopathic pulmonary fibrosis (The Basics)  Patient education: Breathing tests (The Basics)  Patient education: Diagnostic bronchoscopy (The Basics)  Patient education: Bronchiectasis in adults (The Basics)  Patient education: Interstitial lung disease (The Basics)  Patient education: Spirometry (The Basics)  Beyond the Basics -- Beyond the Basics patient education pieces are longer, more sophisticated, and more detailed. These articles are best for patients who want in-depth information and are comfortable with some medical jargon.  Patient education: Acute bronchitis in adults (Beyond the Basics)  Patient education: Asthma treatment in adolescents and adults (Beyond the Basics)  Patient education: Gastroesophageal reflux disease in adults (Beyond the Basics)  Patient education: Chronic obstructive pulmonary disease (COPD) (Beyond the Basics)  Patient education: Lung cancer risks, symptoms, and diagnosis (Beyond the Basics)  Patient education: Upper endoscopy (Beyond the Basics)   Professional level information -- Professional-level articles are designed to keep doctors and other health professionals up-to-date on the latest medical findings. These articles are thorough, long, and complex, and they contain multiple references to the research on which they are based. Professional-level articles are best for people who are comfortable with a lot of medical terminology and who want to read the same materials their doctors are reading.  Clinical manifestations and diagnosis of bronchiectasis in adults  Asthma in adolescents and adults: Evaluation and diagnosis  Evaluation of wheezing illnesses other than asthma in adults  Causes and epidemiology of subacute and  chronic cough in adults  Hoarseness in adults  The common cold in adults: Treatment and prevention  Evaluation and treatment of subacute and chronic cough in adults    The following organizations also provide reliable health information.  ?American College of Chest [employer]icians  (https://foundation.chestnet.org/lung-health-a-z/cough/)  ?[place] [employer]       (https://www.lung.ca/cough-lasting-8-weeks-or-more)  [1-5]  Use of UpToDate is subject to the Terms of Use.  REFERENCES  Kuldeep RS, Scott CT, Keenan SZ, et al. Overview of the management of cough: CHEST Guideline and Expert Panel Report. Chest 2014; 146:885.  Delio PJ, Anuja KW, Armos AB, et al. Chronic Cough Due to Gastroesophageal Reflux in Adults: CHEST Guideline and Expert Panel Report. Chest 2016; 150:1341.  Heber P, Alhaji G, Maximino L, et al. Treatment of Unexplained Chronic Cough: CHEST Guideline and Expert Panel Report. Chest 2016; 149:27.  Morice AH, Annabellavist E, Bieksilesvia K, et al. ERS guidelines on the diagnosis and treatment of chronic cough in adults and children. Eur Respir J 2020; 55.  Michael KM, Elena A, Patrick MJ. Laryngeal Dysfunction Manifesting as Chronic Refractory Cough and Dyspnea: Laryngeal Physiology in Respiratory Health and Disease. Chest 2024; 166:171.    Disclaimer: This generalized information is a limited summary of diagnosis, treatment, and/or medication information. It is not meant to be comprehensive and should be used as a tool to help the user understand and/or assess potential diagnostic and treatment options. It does NOT include all information about conditions, treatments, medications, side effects, or risks that may apply to a specific patient. It is not intended to be medical advice or a substitute for the medical advice, diagnosis, or treatment of a health care provider based on the health care provider's examination and assessment of a patient's specific and unique circumstances. Patients must speak with a  health care provider for complete information about their health, medical questions, and treatment options, including any risks or benefits regarding use of medications. This information does not endorse any treatments or medications as safe, effective, or approved for treating a specific patient. UpToDate, Inc. and its affiliates disclaim any warranty or liability relating to this information or the use thereof. The use of this information is governed by the Terms of Use, available at https://www.Omn[employer]liant.Reviewspotter/en/know/clinical-effectiveness-terms. 2025© UpToDate, Inc. and its affiliates and/or licensors. All rights reserved.  Topic 4644 Version 30.0     Topic Feedback  Contact Us  Support Tag  About Us  [employer]  Language  Terms of Use  Policies  [employer]  Help & Training  Whistle  [place]®     [employer]®                   © 2025 UpToDate, Inc. and/or its affiliates. All Rights Reserved.         © 2025 UpToDate, Inc. and/or its affiliates. All Rights Reserved.    Skip to TopicSkip to References         .0  Sign out      Back  Patient education: Chronic cough in adults (Beyond the Basics)                             Author:  Talat Owens MD, MACP, FRCP  Section Editors:  JUAN JOSE Ovalles, DSc, FRCP, FRS  Diogo Philippe Jr, MD  [place] :  Jamie Blair MD  Contributor Disclosures  All topics are updated as new evidence becomes available and our peer review process is complete.  Literature review current through: May 2025.  This topic last updated: Oct 01, 2024.    Please read the Disclaimer at the end of this page.  OVERVIEW   Coughing from time to time helps clear particles and secretions from the lungs and helps to prevent infection. However, sometimes a cough can become a chronic condition. A chronic cough is usually defined as a cough that lasts for eight weeks or longer.  Although coughing is not usually a sign of a serious problem, it can be  annoying. Coughing frequently is embarrassing, can make you physically tired, make it hard to sleep, and cause you to be dizzy, hoarse, strain muscles (or even break a rib), sweat, and leak urine (especially in females).  This article discusses the possible causes and treatments of chronic cough in adults. Coughing that began less than eight weeks ago is discussed separately. (See \"Patient education: Acute bronchitis in adults (Beyond the Basics)\".) More detailed information about chronic cough is available by subscription. (See \"Causes and epidemiology of subacute and chronic cough in adults\" and \"Evaluation and treatment of subacute and chronic cough in adults\".)  CAUSES OF CHRONIC COUGH   The most common causes of chronic cough are postnasal drip, asthma, and acid reflux from the stomach. These three causes account for up to 90 percent of all cases of chronic cough. Less common causes include infections, medications, and a variety of lung diseases.  Postnasal drip -- Postnasal drip occurs when secretions from the nose drip or flow into the back of the throat from the nose. These secretions can irritate the throat and trigger a cough. Postnasal drip can develop in people with allergies, colds, rhinitis, and sinusitis.  Signs of postnasal drip include a stuffy or runny nose, a sensation of liquid in the back of the throat, and a feeling you need to clear your throat frequently. However, some people have so-called \"silent\" postnasal drip, which causes no symptoms other than a cough.  Asthma -- Asthma is the second most frequent cause of chronic cough in adults and is the leading cause in children. In addition to coughing, you may also wheeze or feel short of breath. However, some people have a condition known as cough variant asthma, in which cough is the only symptom of asthma. (See \"Patient education: Asthma treatment in adolescents and adults (Beyond the Basics)\".)  Asthma-related cough may be seasonal, may  follow an upper respiratory infection, or may get worse with exposure to cold, dry air, or certain fumes or fragrances.  Acid reflux -- Gastroesophageal reflux, also known as acid reflux, occurs when acid from the stomach flows back (refluxes) into the esophagus, the tube connecting the stomach and the throat. Gastroesophageal reflux disease (GERD) refers to symptoms caused by acid reflux. Many people with cough due to acid reflux have heartburn or a sour taste in the mouth. However, some patients with GERD have cough as their only symptom. (See \"Patient education: Gastroesophageal reflux disease in adults (Beyond the Basics)\".)  Other causes -- A number of other conditions can lead to chronic cough. These include:  Respiratory tract infection -- An upper respiratory infection such as a cold can cause a cough that lasts more than eight weeks. This may be due to postnasal drip (as described above), or to irritability in the airways that developed as a result of the infection. Many people with a chronic cough after a respiratory infection respond to treatment for postnasal drip or cough variant asthma. (See 'Postnasal drip' above.)  Sometimes bacterial infections of the sinuses or airways can develop following a viral upper respiratory tract infection. In almost all cases of bacterial airway infections, patients will have a cough that produces sputum. The sputum is colored from light yellow to dark green or even brown. Likewise, in almost all cases of cough from bacterial sinus infections, patients will have sinus congestion, and the nasal secretions that drip or flow into the back of the throat are similarly off-colored. If such colored sputum or postnasal drip continues unimproved for more than 10 to 14 days, antibiotics may be needed to treat the infection. (See \"Patient education: Acute bronchitis in adults (Beyond the Basics)\" and \"Patient education: Acute sinusitis (sinus infection) (Beyond the Basics)\".)  Use  of ACE inhibitors -- Medications known as angiotensin-converting enzyme (ACE) inhibitors, which are commonly used to treat high blood pressure, cause a chronic cough in up to 20 percent of patients. The cough is usually dry and hacking. Switching to another medication often improves the cough over the course of one to two weeks.  Chronic bronchitis -- Chronic bronchitis is a condition in which the airways are irritated, causing you to cough, sometimes raising phlegm. Most people with chronic bronchitis are current or past smokers. (See \"Patient education: Chronic obstructive pulmonary disease (COPD) (Beyond the Basics)\".)  Lung cancer -- Although lung cancer can cause coughing, very few people with a chronic cough have lung cancer. Cancer is possible, however, especially if you are a smoker and your cough changes suddenly, you begin to cough up blood, or if you continue to cough more than one month after quitting smoking. (See \"Patient education: Lung cancer risks, symptoms, and diagnosis (Beyond the Basics)\".)  Eosinophilic bronchitis -- A special type of inflammation in the airways called eosinophilic bronchitis can cause a chronic cough. This is diagnosed when your breathing tests show no evidence of asthma, but your phlegm or airway biopsy shows cells called eosinophils. Eosinophilic bronchitis is much less common than asthma.  Bronchiectasis -- Sometimes one or more severe or recurrent respiratory infections can damage airway walls, leading to widening of the affected airways, decreased ability to clear secretions, and chronic infection of those airways. Patients with bronchiectasis often have a chronic cough productive of white or discolored sputum, sometimes accompanied by blood. A diagnosis of bronchiectasis is typically confirmed by a chest CT scan.  Laryngeal hypersensitivity -- The larynx is the region of the upper airway that includes the vocal cords (voice box). Acute irritation of the larynx leads to  reflexive cough to protect the lower airway and the lungs from entry of food or other foreign material. In some patients, recurrent irritation of the larynx by viral infection or gastroesophageal reflux is believed to initiate laryngeal hypersensitivity and an exaggerated cough response to a variety of stimuli that would not normally trigger coughing (eg, talking, deep breathing, strong smells). The diagnosis of laryngeal hypersensitivity is made after other causes listed above have been excluded.  CHRONIC COUGH DIAGNOSIS   To investigate the cause of a chronic cough, your healthcare provider will ask about your symptoms and perform a physical examination.  Based upon your symptoms and examination, your clinician may recommend a trial of treatment before other testing is performed. If you improve with treatment, no further testing is generally needed. If you do NOT improve, or if your diagnosis is not clear, further testing may be recommended, such as:  Lung imaging -- If you are a current or former cigarette smoker, or if you have other medical conditions that can affect the lung, a chest X-ray or even a chest CT scan may be performed.  Lung function tests -- If asthma is suspected but cannot be confirmed, the clinician may perform lung function tests that measure the pattern of air flow into and out of the lungs.  Acid reflux testing -- To confirm a diagnosis of acid reflux, a test may be done to measure the acid level of fluid in the esophagus. This test is called a pH probe. In some people, a test called upper endoscopy will be done to look for irritation of the esophagus and to obtain a biopsy of the esophagus. (See \"Patient education: Upper endoscopy (Beyond the Basics)\" and \"Patient education: Gastroesophageal reflux disease in adults (Beyond the Basics)\".)  CHRONIC COUGH TREATMENT   Treatment of chronic cough aims to eliminate the underlying cause. Most of the time, each type of treatment is tried  separately, one after another, instead of all at the same time. Seeing which one works best helps to figure out the underlying cause. On the other hand, a number of patients have more than one cause for their chronic cough. In such cases cough only resolves when all causes are successfully treated at the same time. Thus, if your clinician believes there is more than one cause of your cough, or if your cough is particularly disabling, treatment or evaluation of the likely causes may be pursued simultaneously. Once your cough has resolved, treatments can be stopped one at a time, starting with the treatment least likely to have been helpful, observing you for any return of cough.  Here are some examples of treatment choices for different causes of cough.  Postnasal drip -- A cough related to postnasal drip may improve with the use of a decongestant, nasal or oral antihistamine, nasal glucocorticoid, or a nasal spray that contains ipratropium. The best treatment (or combination of treatments) depends upon your symptoms and medical history. The following are some examples of how these medications may be used:  If you have postnasal drip from a cold -- Antihistamines that are taken as a pill, such as chlorpheniramine (brand name: Chlor-Trimeton) or clemastine (Tavist, Dayhist), may help, but can cause side effects such as drowsiness and drying of the eyes, nose, and mouth. Most of these are available over the counter. Decongestants such as pseudoephedrine can improve nasal congestion, make it easier to blow one’s nose, and thus lessen postnasal drip. Most drugstores in the [place] carry pseudoephedrine behind the counter, so you must request it from the pharmacist (a prescription is not required). The nasal spray ipratropium bromide (Atrovent, available without a prescription) can also help relieve runny nose, postnasal drip, and sneezing associated with a cold.  If you have postnasal drip from allergies (“hay  fever”) -- Nasal glucocorticoids can be used to help reduce nasal inflammation, postnasal drip, and cough. Some are available over-the-counter in the [place] (sample brand names: Flonase Allergy Relief, Rhinocort Allergy), while others require a prescription.  Oral antihistamines such as loratadine (Claritin), fexofenadine (Allegra), or cetirizine (Zyrtec) are also effective for allergic postnasal drip and are available without a prescription. They are less likely to cause sleepiness than older generation antihistamines such as chlorpheniramine or diphenhydramine. Nasal antihistamine sprays such as azelastine (Astelin) can also relieve postnasal drip.  These forms of treatment (nasal glucocorticoids and oral or nasal antihistamines) can be tried alone, or they can be used in combination if needed.  Cough variant asthma -- If your cough is due to asthma, you will be given the standard treatment for asthma, which includes an inhaled glucocorticoid such as fluticasone (Flovent), budesonide (Pulmicort), or beclomethasone (QVAR). You may also be prescribed an inhaled bronchodilator such as albuterol (ProAir, Ventolin, or Proventil) if you have wheezing or shortness of breath. The glucocorticoid decreases inflammation of the airways while the inhaled bronchodilator opens up the airways. There are also commonly prescribed inhaled preparations that include both a glucocorticoid and a bronchodilator (eg, Symbicort, Advair). (See \"Patient education: Asthma treatment in adolescents and adults (Beyond the Basics)\".)  Acid reflux -- Cough due to acid reflux may respond to the following lifestyle changes:  ?Avoid substances that increase reflux, such as high-fat foods, chocolate, katie, red wine, acidic juices, and excessive alcohol  ?Avoid eating for two to three hours before lying down  ?Elevate the head of the bed six to eight inches  ?Lose weight, if you are overweight  ?Stop smoking  In addition, you may be given a  medication to slow the production of acid in your stomach, called a proton pump inhibitor. Examples of proton pump inhibitors include omeprazole (Prilosec), esomeprazole (Nexium), and lansoprazole (Prevacid). It may take eight or more weeks of treatment before your cough fully improves. If your cough does not improve during this time, further testing may be recommended.  For more information about acid reflux, (see \"Patient education: Gastroesophageal reflux disease in adults (Beyond the Basics)\").  Eosinophilic bronchitis -- Eosinophilic bronchitis is treated with inhaled glucocorticoids. These medications are also used for asthma and work to decrease inflammation in the airways. Examples include budesonide (Pulmicort) and fluticasone (Flovent).  Cough suppression -- If, after a thorough evaluation, the cause of your cough cannot be determined and the cough persists, a medication that suppresses your cough may be recommended. Possible options include:  ?Nonprescription cough medicines that contain dextromethorphan (Delsym) may help suppress the cough reflex.  ?Benzonatate (Tessalon) is a prescription medication that may be recommended if dextromethorphan is not helpful.  ?Codeine and hydrocodone are prescription opioid medications that can be added to cough syrup; these may be tried if other treatments have not been effective. However, both medications can cause you to feel sleepy and should not be used while working or driving.  ?Gabapentin or pregabalin, which are drugs more commonly used to ameliorate chronic pain by blocking nerve impulses, may be helpful in some patients with chronic cough. Both medications may have side effects, such as nausea and fatigue (with gabapentin) or dizziness, confusion, or difficulty concentrating (with pregabalin), and so they should be started at a low dose and the dose gradually increased only if needed and tolerated.  WHERE TO GET MORE INFORMATION   Your healthcare provider is  the best source of information for questions and concerns related to your medical problem.  This article will be updated as needed on our web site ([email or web address]). Related topics for patients, as well as selected articles written for healthcare professionals, are also available. Some of the most relevant are listed below.  Patient level information -- [employer] offers two types of patient education materials.  The Basics -- The Basics patient education pieces answer the four or five key questions a patient might have about a given condition. These articles are best for patients who want a general overview and who prefer short, easy-to-read materials.  Patient education: Lung cancer (The Basics)  Patient education: Acute bronchitis in adults (The Basics)  Patient education: Coughing up blood (The Basics)  Patient education: Cough in adults (The Basics)  Patient education: Idiopathic pulmonary fibrosis (The Basics)  Patient education: Breathing tests (The Basics)  Patient education: Diagnostic bronchoscopy (The Basics)  Patient education: Bronchiectasis in adults (The Basics)  Patient education: Interstitial lung disease (The Basics)  Patient education: Spirometry (The Basics)  Beyond the Basics -- Beyond the Basics patient education pieces are longer, more sophisticated, and more detailed. These articles are best for patients who want in-depth information and are comfortable with some medical jargon.  Patient education: Acute bronchitis in adults (Beyond the Basics)  Patient education: Asthma treatment in adolescents and adults (Beyond the Basics)  Patient education: Gastroesophageal reflux disease in adults (Beyond the Basics)  Patient education: Chronic obstructive pulmonary disease (COPD) (Beyond the Basics)  Patient education: Lung cancer risks, symptoms, and diagnosis (Beyond the Basics)  Patient education: Upper endoscopy (Beyond the Basics)   Professional level information -- Professional-level  articles are designed to keep doctors and other health professionals up-to-date on the latest medical findings. These articles are thorough, long, and complex, and they contain multiple references to the research on which they are based. Professional-level articles are best for people who are comfortable with a lot of medical terminology and who want to read the same materials their doctors are reading.  Clinical manifestations and diagnosis of bronchiectasis in adults  Asthma in adolescents and adults: Evaluation and diagnosis  Evaluation of wheezing illnesses other than asthma in adults  Causes and epidemiology of subacute and chronic cough in adults  Hoarseness in adults  The common cold in adults: Treatment and prevention  Evaluation and treatment of subacute and chronic cough in adults    The following organizations also provide reliable health information.  ?American College of Chest Physicians  (https://foundation.chestnet.org/lung-health-a-z/cough/)  ?[place] [employer] Association       (https://www.lung.ca/cough-lasting-8-weeks-or-more)  [1-5]  Use of UpToDate is subject to the Terms of Use.  REFERENCES  Kuldeep RS, Scott CT, Keenan SZ, et al. Overview of the management of cough: CHEST Guideline and Expert Panel Report. Chest 2014; 146:885.  Delio PJ, Anuja KW, Ramos AB, et al. Chronic Cough Due to Gastroesophageal Reflux in Adults: CHEST Guideline and Expert Panel Report. Chest 2016; 150:1341.  Buck P, Mane G, Maximino L, et al. Treatment of Unexplained Chronic Cough: CHEST Guideline and Expert Panel Report. Chest 2016; 149:27.  Kendallce AH, Arunqvist E, Bieksilesvia K, et al. ERS guidelines on the diagnosis and treatment of chronic cough in adults and children. Eur Respir J 2020; 55.  Michael KM, Elena A, Patrick MJ. Laryngeal Dysfunction Manifesting as Chronic Refractory Cough and Dyspnea: Laryngeal Physiology in Respiratory Health and Disease. Chest 2024; 166:171.    Disclaimer: This generalized information is  a limited summary of diagnosis, treatment, and/or medication information. It is not meant to be comprehensive and should be used as a tool to help the user understand and/or assess potential diagnostic and treatment options. It does NOT include all information about conditions, treatments, medications, side effects, or risks that may apply to a specific patient. It is not intended to be medical advice or a substitute for the medical advice, diagnosis, or treatment of a health care provider based on the health care provider's examination and assessment of a patient's specific and unique circumstances. Patients must speak with a health care provider for complete information about their health, medical questions, and treatment options, including any risks or benefits regarding use of medications. This information does not endorse any treatments or medications as safe, effective, or approved for treating a specific patient. UpToDate, Inc. and its affiliates disclaim any warranty or liability relating to this information or the use thereof. The use of this information is governed by the Terms of Use, available at https://www.wolterskluwer.com/en/know/clinical-effectiveness-terms. 2025© UpToDate, Inc. and its affiliates and/or licensors. All rights reserved.  Topic 4644 Version 30.0

## 2025-06-26 NOTE — PROGRESS NOTES
Alyssa Bush is a 45 year old female.    S:  Patient presents today with the following concerns:  Chief Complaint   Patient presents with    Cough     6/15/25 +Covid, lingering cough, throwing up mucus, chest congestion  OTC Nyquil, Sudafed    Denies fever   Symptoms began 6/13/25. Had fevers, severe sore throat, coughing.  Was seen in ED and had labs and CXR done.  Prescribed Paxlovid.  No fevers now.  Having coughing fits particularly at night.  Denies wheezing or dyspnea.  Nasal congestion and some sinus pressure.  Chest hurts from coughing.  Hands have been itching at night for a couple nights.  Has a rash.  Hx of eczema.      Current Medications[1]  Problem List[2]  Family History[3]    REVIEW OF SYSTEMS:  GENERAL: feels well otherwise  SKIN: denies any unusual skin lesions  EYES:denies vision change  LUNGS: denies shortness of breath with exertion  CARDIOVASCULAR: denies chest pain on exertion  GI: denies abdominal pain.  No N/V/D/C  : denies dysuria  MUSCULOSKELETAL: denies back pain  NEURO: headaches    EXAM:  /82   Pulse 100   Temp 98.2 °F (36.8 °C) (Temporal)   Resp 18   Ht 5' 6\" (1.676 m)   Wt 135 lb (61.2 kg)   LMP 06/12/2025   SpO2 98%   BMI 21.79 kg/m²   Physical Exam  Constitutional:       General: She is not in acute distress.     Appearance: Normal appearance. She is not ill-appearing, toxic-appearing or diaphoretic.   HENT:      Head: Normocephalic and atraumatic.      Right Ear: Tympanic membrane, ear canal and external ear normal.      Left Ear: Tympanic membrane, ear canal and external ear normal.      Nose: Congestion present.      Mouth/Throat:      Mouth: Mucous membranes are moist.      Pharynx: Oropharynx is clear. No oropharyngeal exudate or posterior oropharyngeal erythema.   Eyes:      Extraocular Movements: Extraocular movements intact.      Conjunctiva/sclera: Conjunctivae normal.      Pupils: Pupils are equal, round, and reactive to light.   Cardiovascular:      Rate  and Rhythm: Normal rate and regular rhythm.      Heart sounds: Normal heart sounds.   Pulmonary:      Effort: Pulmonary effort is normal.      Breath sounds: Normal breath sounds.   Musculoskeletal:      Cervical back: Neck supple. No rigidity or tenderness.   Lymphadenopathy:      Cervical: No cervical adenopathy.   Skin:     General: Skin is warm and dry.      Comments: Patient has an erythematous vesiculopapular rash on the sides of multiple fingers.   Neurological:      General: No focal deficit present.      Mental Status: She is alert and oriented to person, place, and time.   Psychiatric:         Mood and Affect: Mood normal.         Behavior: Behavior normal.        ASSESSMENT AND PLAN:  Alyssa Bush is a 45 year old female.  Encounter Diagnoses   Name Primary?    Post-viral cough syndrome Yes    Dyshidrotic eczema          No orders of the defined types were placed in this encounter.    Meds & Refills for this Visit:  Requested Prescriptions     Signed Prescriptions Disp Refills    predniSONE 20 MG Oral Tab 13 tablet 0     Sig: Take 2 tablets (40 mg total) by mouth daily for 5 days, THEN 1 tablet (20 mg total) daily for 3 days.    albuterol (PROAIR HFA) 108 (90 Base) MCG/ACT Inhalation Aero Soln 1 each 0     Si-2 puffs every 4-6 hours for 2-3 days while awake then prn    fluticasone propionate 50 MCG/ACT Nasal Suspension 1 each 0     Si sprays by Each Nare route daily.    benzonatate 200 MG Oral Cap 30 capsule 0     Sig: Take 1 capsule (200 mg total) by mouth 3 (three) times daily as needed.    Mometasone Furoate 0.1 % External Cream 60 g 0     Sig: Apply 1 Application topically in the morning and 1 Application before bedtime. Do all this for 7 days. To the hands.    doxycycline 100 MG Oral Cap 14 capsule 0     Sig: Take 1 capsule (100 mg total) by mouth 2 (two) times daily for 7 days.     Imaging & Consults:  None    No follow-ups on file.   Start with prednisone, albuterol, benzonatate.  Discussed  side effects, adverse reactions of medications.    Mometasone cream BID to the affected areas on hands.  Gentle skin care.  Avoid water exposure.    Apply moisturizing cream over the mometasone.    RX printed for Doxycycline.  To take if symptoms are not improving after 2-3 days or if they are worsening.      Fluids, steam, rest.    Go to ED with severe dyspnea or chest pain.    Follow up with PCP if symptoms do not resolve.    Patient verbalizes understanding of plan.         [1]   Current Outpatient Medications   Medication Sig Dispense Refill    predniSONE 20 MG Oral Tab Take 2 tablets (40 mg total) by mouth daily for 5 days, THEN 1 tablet (20 mg total) daily for 3 days. 13 tablet 0    albuterol (PROAIR HFA) 108 (90 Base) MCG/ACT Inhalation Aero Soln 1-2 puffs every 4-6 hours for 2-3 days while awake then prn 1 each 0    fluticasone propionate 50 MCG/ACT Nasal Suspension 2 sprays by Each Nare route daily. 1 each 0    benzonatate 200 MG Oral Cap Take 1 capsule (200 mg total) by mouth 3 (three) times daily as needed. 30 capsule 0    Mometasone Furoate 0.1 % External Cream Apply 1 Application topically in the morning and 1 Application before bedtime. Do all this for 7 days. To the hands. 60 g 0    doxycycline 100 MG Oral Cap Take 1 capsule (100 mg total) by mouth 2 (two) times daily for 7 days. 14 capsule 0    ALPRAZolam 0.25 MG Oral Tab Take 1 tablet (0.25 mg total) by mouth 2 (two) times daily as needed for Anxiety. 10 tablet 3    SUMAtriptan 50 MG Oral Tab Take 1 tablet (50 mg total) by mouth daily as needed for Migraine. May repeat in 2 hr if needed. Max dose 100mg/24 hr 9 tablet 1    Omeprazole 40 MG Oral Capsule Delayed Release Take 1 capsule (40 mg total) by mouth daily. 90 capsule 3   [2]   Patient Active Problem List  Diagnosis    GERD (gastroesophageal reflux disease)    Family history of breast cancer in female   [3]   Family History  Problem Relation Age of Onset    Diabetes Mother     Cancer Mother 50         breast cancer    Breast Cancer Mother 50        est    Cancer Maternal Grandmother         breast cancer   50- 60 years old    Breast Cancer Maternal Grandmother 60        est    High Cholesterol Father     Heart Attack Father     Heart Attack Maternal Grandfather     Thyroid Disorder Sister     Lipids Sister     Diabetes Sister

## 2025-07-14 DIAGNOSIS — F06.4 ANXIETY DISORDER DUE TO GENERAL MEDICAL CONDITION: ICD-10-CM

## 2025-07-14 DIAGNOSIS — N95.1 PERIMENOPAUSE: ICD-10-CM

## 2025-07-17 NOTE — TELEPHONE ENCOUNTER
Please review.  Protocol Failed.    Recent fills each # 10 : 2/16, 5/14  Last prescription written: 11/26/24 # 10 +3R  Last office visit: 11/26/2024    No future appointments.      Requested Prescriptions   Pending Prescriptions Disp Refills    ALPRAZOLAM 0.25 MG Oral Tab [Pharmacy Med Name: ALPRAZOLAM 0.25MG   TAB] 10 tablet 0     Sig: Take 1 tablet by mouth twice daily as needed for anxiety       Controlled Substance Medication Failed - 7/17/2025  6:55 PM        Failed - This medication is a controlled substance - forward to provider to refill        Passed - Medication is active on med list

## 2025-07-18 RX ORDER — ALPRAZOLAM 0.25 MG
0.25 TABLET ORAL 2 TIMES DAILY PRN
Qty: 10 TABLET | Refills: 3 | Status: SHIPPED | OUTPATIENT
Start: 2025-07-18

## 2025-08-15 ENCOUNTER — OFFICE VISIT (OUTPATIENT)
Dept: FAMILY MEDICINE CLINIC | Facility: CLINIC | Age: 45
End: 2025-08-15

## 2025-08-15 VITALS
DIASTOLIC BLOOD PRESSURE: 70 MMHG | WEIGHT: 135 LBS | OXYGEN SATURATION: 98 % | SYSTOLIC BLOOD PRESSURE: 108 MMHG | RESPIRATION RATE: 16 BRPM | BODY MASS INDEX: 21.69 KG/M2 | HEIGHT: 66 IN | TEMPERATURE: 99 F | HEART RATE: 91 BPM

## 2025-08-15 DIAGNOSIS — J02.9 SORE THROAT: Primary | ICD-10-CM

## 2025-08-15 DIAGNOSIS — J02.0 STREP PHARYNGITIS: ICD-10-CM

## 2025-08-15 LAB
CONTROL LINE PRESENT WITH A CLEAR BACKGROUND (YES/NO): YES YES/NO
KIT LOT #: ABNORMAL NUMERIC
OPERATOR ID: NORMAL
RAPID SARS-COV-2 BY PCR: NOT DETECTED
STREP GRP A CUL-SCR: POSITIVE

## 2025-08-15 RX ORDER — AMOXICILLIN 500 MG/1
500 CAPSULE ORAL 2 TIMES DAILY
Qty: 20 CAPSULE | Refills: 0 | Status: SHIPPED | OUTPATIENT
Start: 2025-08-15 | End: 2025-08-25

## (undated) NOTE — LETTER
06/14/21        [address] [facility]      Dear Kristin Hair records indicate that you have outstanding lab work and or testing that was ordered for you and has not yet been completed:        TSH and Free T4 [E]    To provide yo

## (undated) NOTE — MR AVS SNAPSHOT
After Visit Summary   5/1/2017    Evelyn Ro    MRN: [id number]           Diagnoses this Visit     Normocytic normochromic anemia    -  Primary     Iron deficiency anemia due to chronic blood loss           Allergies     No Known Allergies      Yo discharge instructions in [employer] by going to Visits < Admission Summaries. If you've been to the Emergency Department or your doctor's office, you can view your past visit information in [employer] by going to Visits < Visit Summaries. [employer] questions?

## (undated) NOTE — LETTER
[facility]  Noezur[address]oz[address] [facility], [address] [place] [address]    Consent for Operation    Date: __________________    Time: _______________    1.  I authorize the performance upon Laura Adair the following operation:                              Repeat Janey Montejo videotape. [facility] will not be responsible for storage or maintenance of this tape. 6. For the purpose of advancing medical education, I consent to the admittance of observers to the Operating Room.     7. I authorize the use of any specimen, organs Signature of Patient:   ___________________________    When the patient is a minor or mentally incompetent to give consent:  Signature of person authorized to consent for patient: ___________________________   Relationship to patient: _____________________ · If I am allergic to anything or have had a reaction to anesthesia before. 3. I understand how the anesthesia medicine will help me (benefits). 4. I understand that with any type of anesthesia medicine there are risks:  a.  The most common risks are: their representative has agreed to have anesthesia services.     _____________________________________________________________________________  Witness        Date   Time  I have verified that the signature is that of the patient or patient’s representative

## (undated) NOTE — IP AVS SNAPSHOT
[facility] [place] [address] [place], [address] ~ [phone]                Discharge Summary   6/9/2017    Mrs. Florinda Hylton           Admission Information        Provider Department    6/9/2017 Ruslan Ramos MD  [id number]      Cecy Santos DICLEGIS 10-10 MG Tbec   Generic drug:  doxylamine-pyridoxine                Where to Get Your Medications      Please  your prescriptions at the location directed by your doctor or nurse     Bring a paper prescription for each of these medications B (06/09/17)  Negative      (11/18/16)  B (11/18/16)  Negative        Recent Hematology Lab Results  (Last 3 results in the past 90 days)    WBC RBC Hemoglobin Hematocrit MCV MCH MCHC RDW Platelet MPV    (45/69/69)  11.7 (06/11/17)  3.38 (L) (06/11/17)  9. Pain Management Resolved   Fetal Monitoring Resolved   Delivery Process Resolved   [employer]ytics Resolved   Antibiotics Resolved   Psychosocial/Spiritual Support Resolved         Handwashing & Infection Prevention  Resolved   Handwashing and Respiratory Hygie discharge instructions in [employer] by going to Visits < Admission Summaries. If you've been to the Emergency Department or your doctor's office, you can view your past visit information in [employer] by going to Visits < Visit Summaries. [employer] questions?

## (undated) NOTE — MR AVS SNAPSHOT
[facility]  [address] [facility] [place] [facility] [place] [place], [facility] [address] [phone]               Thank you for choosing us for your health care visit with Arnold Driscoll MD.  We are glad to serve you and happy amoxicillin 875 MG Tabs   Take 1 tablet (875 mg total) by mouth 2 (two) times daily. Commonly known as:  AMOXIL           DICLEGIS 10-10 MG Tbec   Generic drug:  doxylamine-pyridoxine   Take by mouth.            Omeprazole 40 MG Cpdr   Take 1 capsule (40

## (undated) NOTE — LETTER
07/03/18        [address] [facility]      Dear Shi Renner records indicate that you have outstanding lab work and or testing that was ordered for you and has not yet been completed:          TSH and Free T4  To provide you wi

## (undated) NOTE — MR AVS SNAPSHOT
After Visit Summary   5/5/2017    Maureen Ruiz    MRN: [id number]           Diagnoses this Visit     Iron deficiency anemia due to chronic blood loss    -  Primary     Iron deficiency anemia refractory to iron therapy           Allergies     No Know · pain, tingling, numbness in the hands or feet  · seizures  · unusually weak or tired  Side effects that usually do not require medical attention (report to your doctor or health care professional if they continue or are bothersome):  · change in taste (m

## (undated) NOTE — LETTER
Date: 6/26/2025    Patient Name: Alyssa Bush          To Whom it may concern:    The above patient was seen at [facility] for treatment of a medical condition today.  She is under our care.    Sincerely,      Winifred Cummings PA-C